# Patient Record
Sex: FEMALE | Race: WHITE | NOT HISPANIC OR LATINO | Employment: OTHER | URBAN - METROPOLITAN AREA
[De-identification: names, ages, dates, MRNs, and addresses within clinical notes are randomized per-mention and may not be internally consistent; named-entity substitution may affect disease eponyms.]

---

## 2021-01-25 ENCOUNTER — APPOINTMENT (INPATIENT)
Dept: RADIOLOGY | Facility: HOSPITAL | Age: 66
DRG: 603 | End: 2021-01-25
Payer: MEDICARE

## 2021-01-25 ENCOUNTER — HOSPITAL ENCOUNTER (INPATIENT)
Facility: HOSPITAL | Age: 66
LOS: 2 days | Discharge: HOME | DRG: 603 | End: 2021-01-27
Attending: EMERGENCY MEDICINE | Admitting: INTERNAL MEDICINE
Payer: MEDICARE

## 2021-01-25 DIAGNOSIS — N39.0 URINARY TRACT INFECTION: ICD-10-CM

## 2021-01-25 DIAGNOSIS — A41.9 SEPSIS (HCC): ICD-10-CM

## 2021-01-25 DIAGNOSIS — L03.116 CELLULITIS OF LEFT THIGH: Primary | ICD-10-CM

## 2021-01-25 PROBLEM — D64.9 ANEMIA: Status: ACTIVE | Noted: 2021-01-25

## 2021-01-25 PROBLEM — L03.114 CELLULITIS OF LEFT UPPER EXTREMITY: Status: ACTIVE | Noted: 2021-01-25

## 2021-01-25 PROBLEM — Z96.642 HISTORY OF REVISION OF TOTAL REPLACEMENT OF LEFT HIP JOINT: Status: ACTIVE | Noted: 2021-01-25

## 2021-01-25 PROBLEM — E87.1 HYPONATREMIA: Status: ACTIVE | Noted: 2021-01-25

## 2021-01-25 PROBLEM — F34.1 DYSTHYMIA: Status: ACTIVE | Noted: 2021-01-25

## 2021-01-25 PROBLEM — M05.79 RHEUMATOID ARTHRITIS INVOLVING MULTIPLE SITES WITH POSITIVE RHEUMATOID FACTOR (HCC): Status: ACTIVE | Noted: 2021-01-25

## 2021-01-25 LAB
ALBUMIN SERPL BCP-MCNC: 2.9 G/DL (ref 3.5–5)
ALP SERPL-CCNC: 98 U/L (ref 46–116)
ALT SERPL W P-5'-P-CCNC: 20 U/L (ref 12–78)
ANION GAP SERPL CALCULATED.3IONS-SCNC: 9 MMOL/L (ref 4–13)
APTT PPP: 36 SECONDS (ref 23–37)
AST SERPL W P-5'-P-CCNC: 17 U/L (ref 5–45)
BACTERIA UR QL AUTO: ABNORMAL /HPF
BASOPHILS # BLD AUTO: 0.02 THOUSANDS/ΜL (ref 0–0.1)
BASOPHILS NFR BLD AUTO: 0 % (ref 0–1)
BILIRUB SERPL-MCNC: 0.4 MG/DL (ref 0.2–1)
BILIRUB UR QL STRIP: NEGATIVE
BUN SERPL-MCNC: 22 MG/DL (ref 5–25)
CALCIUM ALBUM COR SERPL-MCNC: 10 MG/DL (ref 8.3–10.1)
CALCIUM SERPL-MCNC: 9.1 MG/DL (ref 8.3–10.1)
CHLORIDE SERPL-SCNC: 98 MMOL/L (ref 100–108)
CLARITY UR: ABNORMAL
CO2 SERPL-SCNC: 25 MMOL/L (ref 21–32)
COLOR UR: YELLOW
CREAT SERPL-MCNC: 0.81 MG/DL (ref 0.6–1.3)
CRP SERPL QL: 177.9 MG/L
EOSINOPHIL # BLD AUTO: 0.01 THOUSAND/ΜL (ref 0–0.61)
EOSINOPHIL NFR BLD AUTO: 0 % (ref 0–6)
ERYTHROCYTE [DISTWIDTH] IN BLOOD BY AUTOMATED COUNT: 15 % (ref 11.6–15.1)
GFR SERPL CREATININE-BSD FRML MDRD: 75 ML/MIN/1.73SQ M
GLUCOSE SERPL-MCNC: 95 MG/DL (ref 65–140)
GLUCOSE UR STRIP-MCNC: NEGATIVE MG/DL
HCT VFR BLD AUTO: 36.2 % (ref 34.8–46.1)
HGB BLD-MCNC: 11.4 G/DL (ref 11.5–15.4)
HGB UR QL STRIP.AUTO: ABNORMAL
IMM GRANULOCYTES # BLD AUTO: 0.04 THOUSAND/UL (ref 0–0.2)
IMM GRANULOCYTES NFR BLD AUTO: 1 % (ref 0–2)
INR PPP: 1.12 (ref 0.84–1.19)
KETONES UR STRIP-MCNC: NEGATIVE MG/DL
LACTATE SERPL-SCNC: 0.9 MMOL/L (ref 0.5–2)
LEUKOCYTE ESTERASE UR QL STRIP: ABNORMAL
LYMPHOCYTES # BLD AUTO: 0.52 THOUSANDS/ΜL (ref 0.6–4.47)
LYMPHOCYTES NFR BLD AUTO: 9 % (ref 14–44)
MCH RBC QN AUTO: 31.1 PG (ref 26.8–34.3)
MCHC RBC AUTO-ENTMCNC: 31.5 G/DL (ref 31.4–37.4)
MCV RBC AUTO: 99 FL (ref 82–98)
MONOCYTES # BLD AUTO: 0.77 THOUSAND/ΜL (ref 0.17–1.22)
MONOCYTES NFR BLD AUTO: 13 % (ref 4–12)
MUCOUS THREADS UR QL AUTO: ABNORMAL
NEUTROPHILS # BLD AUTO: 4.76 THOUSANDS/ΜL (ref 1.85–7.62)
NEUTS SEG NFR BLD AUTO: 77 % (ref 43–75)
NITRITE UR QL STRIP: NEGATIVE
NON-SQ EPI CELLS URNS QL MICRO: ABNORMAL /HPF
NRBC BLD AUTO-RTO: 0 /100 WBCS
PH UR STRIP.AUTO: >=9 [PH]
PLATELET # BLD AUTO: 290 THOUSANDS/UL (ref 149–390)
PMV BLD AUTO: 9.1 FL (ref 8.9–12.7)
POTASSIUM SERPL-SCNC: 3.7 MMOL/L (ref 3.5–5.3)
PROCALCITONIN SERPL-MCNC: 0.18 NG/ML
PROT SERPL-MCNC: 7.1 G/DL (ref 6.4–8.2)
PROT UR STRIP-MCNC: ABNORMAL MG/DL
PROTHROMBIN TIME: 14.4 SECONDS (ref 11.6–14.5)
RBC # BLD AUTO: 3.67 MILLION/UL (ref 3.81–5.12)
RBC #/AREA URNS AUTO: ABNORMAL /HPF
SODIUM SERPL-SCNC: 132 MMOL/L (ref 136–145)
SP GR UR STRIP.AUTO: 1.02 (ref 1–1.03)
UROBILINOGEN UR QL STRIP.AUTO: 0.2 E.U./DL
WBC # BLD AUTO: 6.12 THOUSAND/UL (ref 4.31–10.16)
WBC #/AREA URNS AUTO: ABNORMAL /HPF

## 2021-01-25 PROCEDURE — 36415 COLL VENOUS BLD VENIPUNCTURE: CPT | Performed by: PHYSICIAN ASSISTANT

## 2021-01-25 PROCEDURE — 85025 COMPLETE CBC W/AUTO DIFF WBC: CPT | Performed by: PHYSICIAN ASSISTANT

## 2021-01-25 PROCEDURE — 83605 ASSAY OF LACTIC ACID: CPT | Performed by: PHYSICIAN ASSISTANT

## 2021-01-25 PROCEDURE — 73502 X-RAY EXAM HIP UNI 2-3 VIEWS: CPT

## 2021-01-25 PROCEDURE — 93005 ELECTROCARDIOGRAM TRACING: CPT

## 2021-01-25 PROCEDURE — 81001 URINALYSIS AUTO W/SCOPE: CPT | Performed by: PHYSICIAN ASSISTANT

## 2021-01-25 PROCEDURE — 1123F ACP DISCUSS/DSCN MKR DOCD: CPT | Performed by: PHYSICIAN ASSISTANT

## 2021-01-25 PROCEDURE — 84145 PROCALCITONIN (PCT): CPT | Performed by: PHYSICIAN ASSISTANT

## 2021-01-25 PROCEDURE — 99223 1ST HOSP IP/OBS HIGH 75: CPT | Performed by: INTERNAL MEDICINE

## 2021-01-25 PROCEDURE — 80053 COMPREHEN METABOLIC PANEL: CPT | Performed by: PHYSICIAN ASSISTANT

## 2021-01-25 PROCEDURE — 99285 EMERGENCY DEPT VISIT HI MDM: CPT

## 2021-01-25 PROCEDURE — 73552 X-RAY EXAM OF FEMUR 2/>: CPT

## 2021-01-25 PROCEDURE — 85730 THROMBOPLASTIN TIME PARTIAL: CPT | Performed by: PHYSICIAN ASSISTANT

## 2021-01-25 PROCEDURE — 87040 BLOOD CULTURE FOR BACTERIA: CPT | Performed by: PHYSICIAN ASSISTANT

## 2021-01-25 PROCEDURE — 86140 C-REACTIVE PROTEIN: CPT | Performed by: PHYSICIAN ASSISTANT

## 2021-01-25 PROCEDURE — 96374 THER/PROPH/DIAG INJ IV PUSH: CPT

## 2021-01-25 PROCEDURE — 99284 EMERGENCY DEPT VISIT MOD MDM: CPT | Performed by: PHYSICIAN ASSISTANT

## 2021-01-25 PROCEDURE — 85610 PROTHROMBIN TIME: CPT | Performed by: PHYSICIAN ASSISTANT

## 2021-01-25 RX ORDER — LANOLIN ALCOHOL/MO/W.PET/CERES
800 CREAM (GRAM) TOPICAL 2 TIMES DAILY
Status: DISCONTINUED | OUTPATIENT
Start: 2021-01-25 | End: 2021-01-27 | Stop reason: HOSPADM

## 2021-01-25 RX ORDER — DOCUSATE SODIUM 100 MG/1
200 CAPSULE, LIQUID FILLED ORAL
Status: DISCONTINUED | OUTPATIENT
Start: 2021-01-25 | End: 2021-01-27 | Stop reason: HOSPADM

## 2021-01-25 RX ORDER — ASPIRIN 325 MG
325 TABLET ORAL DAILY
COMMUNITY

## 2021-01-25 RX ORDER — PRAMIPEXOLE DIHYDROCHLORIDE 0.5 MG/1
0.75 TABLET ORAL 4 TIMES DAILY
Status: DISCONTINUED | OUTPATIENT
Start: 2021-01-25 | End: 2021-01-27 | Stop reason: HOSPADM

## 2021-01-25 RX ORDER — GABAPENTIN 300 MG/1
300 CAPSULE ORAL 2 TIMES DAILY
Status: DISCONTINUED | OUTPATIENT
Start: 2021-01-25 | End: 2021-01-27 | Stop reason: HOSPADM

## 2021-01-25 RX ORDER — HYDROXYCHLOROQUINE SULFATE 200 MG/1
200 TABLET, FILM COATED ORAL 2 TIMES DAILY
Status: DISCONTINUED | OUTPATIENT
Start: 2021-01-25 | End: 2021-01-27 | Stop reason: HOSPADM

## 2021-01-25 RX ORDER — UREA 10 %
800 LOTION (ML) TOPICAL 2 TIMES DAILY
COMMUNITY

## 2021-01-25 RX ORDER — AMITRIPTYLINE HYDROCHLORIDE 50 MG/1
100 TABLET, FILM COATED ORAL
Status: DISCONTINUED | OUTPATIENT
Start: 2021-01-25 | End: 2021-01-27 | Stop reason: HOSPADM

## 2021-01-25 RX ORDER — ASPIRIN 325 MG
325 TABLET ORAL DAILY
Status: DISCONTINUED | OUTPATIENT
Start: 2021-01-26 | End: 2021-01-27 | Stop reason: HOSPADM

## 2021-01-25 RX ORDER — BACLOFEN 10 MG/1
10 TABLET ORAL
Status: DISCONTINUED | OUTPATIENT
Start: 2021-01-25 | End: 2021-01-26

## 2021-01-25 RX ORDER — SODIUM CHLORIDE 9 MG/ML
75 INJECTION, SOLUTION INTRAVENOUS CONTINUOUS
Status: DISCONTINUED | OUTPATIENT
Start: 2021-01-25 | End: 2021-01-26

## 2021-01-25 RX ORDER — AZITHROMYCIN 250 MG/1
250 TABLET, FILM COATED ORAL EVERY 24 HOURS
COMMUNITY

## 2021-01-25 RX ORDER — DOCUSATE SODIUM 100 MG/1
200 CAPSULE, LIQUID FILLED ORAL
COMMUNITY

## 2021-01-25 RX ORDER — ACETAMINOPHEN 325 MG/1
650 TABLET ORAL ONCE
Status: COMPLETED | OUTPATIENT
Start: 2021-01-25 | End: 2021-01-25

## 2021-01-25 RX ORDER — ONDANSETRON 2 MG/ML
4 INJECTION INTRAMUSCULAR; INTRAVENOUS EVERY 6 HOURS PRN
Status: DISCONTINUED | OUTPATIENT
Start: 2021-01-25 | End: 2021-01-27 | Stop reason: HOSPADM

## 2021-01-25 RX ORDER — ACETAMINOPHEN 325 MG/1
650 TABLET ORAL EVERY 6 HOURS PRN
Status: DISCONTINUED | OUTPATIENT
Start: 2021-01-25 | End: 2021-01-27 | Stop reason: HOSPADM

## 2021-01-25 RX ORDER — BACLOFEN 10 MG/1
10 TABLET ORAL
COMMUNITY

## 2021-01-25 RX ORDER — CEFTRIAXONE 1 G/50ML
1000 INJECTION, SOLUTION INTRAVENOUS ONCE
Status: COMPLETED | OUTPATIENT
Start: 2021-01-25 | End: 2021-01-25

## 2021-01-25 RX ORDER — CEFTRIAXONE 1 G/50ML
1000 INJECTION, SOLUTION INTRAVENOUS EVERY 24 HOURS
Status: DISCONTINUED | OUTPATIENT
Start: 2021-01-26 | End: 2021-01-27

## 2021-01-25 RX ADMIN — DOCUSATE SODIUM 200 MG: 100 CAPSULE, LIQUID FILLED ORAL at 21:42

## 2021-01-25 RX ADMIN — SODIUM CHLORIDE 75 ML/HR: 0.9 INJECTION, SOLUTION INTRAVENOUS at 21:32

## 2021-01-25 RX ADMIN — FOLIC ACID TAB 400 MCG 800 MCG: 400 TAB at 21:43

## 2021-01-25 RX ADMIN — ACETAMINOPHEN 650 MG: 325 TABLET, FILM COATED ORAL at 18:21

## 2021-01-25 RX ADMIN — GABAPENTIN 300 MG: 300 CAPSULE ORAL at 21:42

## 2021-01-25 RX ADMIN — VANCOMYCIN HYDROCHLORIDE 750 MG: 750 INJECTION, SOLUTION INTRAVENOUS at 21:43

## 2021-01-25 RX ADMIN — PRAMIPEXOLE DIHYDROCHLORIDE 0.75 MG: 0.5 TABLET ORAL at 21:42

## 2021-01-25 RX ADMIN — AMITRIPTYLINE HYDROCHLORIDE 100 MG: 50 TABLET, FILM COATED ORAL at 21:42

## 2021-01-25 RX ADMIN — CEFTRIAXONE 1000 MG: 1 INJECTION, SOLUTION INTRAVENOUS at 18:21

## 2021-01-25 RX ADMIN — HYDROXYCHLOROQUINE SULFATE 200 MG: 200 TABLET, FILM COATED ORAL at 21:48

## 2021-01-26 PROBLEM — L03.116 CELLULITIS OF LEFT LOWER EXTREMITY: Status: ACTIVE | Noted: 2021-01-25

## 2021-01-26 LAB
ANION GAP SERPL CALCULATED.3IONS-SCNC: 7 MMOL/L (ref 4–13)
BASOPHILS # BLD AUTO: 0.03 THOUSANDS/ΜL (ref 0–0.1)
BASOPHILS NFR BLD AUTO: 1 % (ref 0–1)
BUN SERPL-MCNC: 15 MG/DL (ref 5–25)
CALCIUM SERPL-MCNC: 8.9 MG/DL (ref 8.3–10.1)
CHLORIDE SERPL-SCNC: 100 MMOL/L (ref 100–108)
CO2 SERPL-SCNC: 26 MMOL/L (ref 21–32)
CREAT SERPL-MCNC: 0.81 MG/DL (ref 0.6–1.3)
EOSINOPHIL # BLD AUTO: 0.09 THOUSAND/ΜL (ref 0–0.61)
EOSINOPHIL NFR BLD AUTO: 2 % (ref 0–6)
ERYTHROCYTE [DISTWIDTH] IN BLOOD BY AUTOMATED COUNT: 15.1 % (ref 11.6–15.1)
GFR SERPL CREATININE-BSD FRML MDRD: 75 ML/MIN/1.73SQ M
GLUCOSE SERPL-MCNC: 108 MG/DL (ref 65–140)
HCT VFR BLD AUTO: 31.3 % (ref 34.8–46.1)
HGB BLD-MCNC: 9.8 G/DL (ref 11.5–15.4)
IMM GRANULOCYTES # BLD AUTO: 0.11 THOUSAND/UL (ref 0–0.2)
IMM GRANULOCYTES NFR BLD AUTO: 2 % (ref 0–2)
LYMPHOCYTES # BLD AUTO: 0.87 THOUSANDS/ΜL (ref 0.6–4.47)
LYMPHOCYTES NFR BLD AUTO: 15 % (ref 14–44)
MCH RBC QN AUTO: 31.2 PG (ref 26.8–34.3)
MCHC RBC AUTO-ENTMCNC: 31.3 G/DL (ref 31.4–37.4)
MCV RBC AUTO: 100 FL (ref 82–98)
MONOCYTES # BLD AUTO: 1.01 THOUSAND/ΜL (ref 0.17–1.22)
MONOCYTES NFR BLD AUTO: 17 % (ref 4–12)
NEUTROPHILS # BLD AUTO: 3.81 THOUSANDS/ΜL (ref 1.85–7.62)
NEUTS SEG NFR BLD AUTO: 63 % (ref 43–75)
NRBC BLD AUTO-RTO: 0 /100 WBCS
PLATELET # BLD AUTO: 242 THOUSANDS/UL (ref 149–390)
PMV BLD AUTO: 9.2 FL (ref 8.9–12.7)
POTASSIUM SERPL-SCNC: 3.8 MMOL/L (ref 3.5–5.3)
PROCALCITONIN SERPL-MCNC: 0.15 NG/ML
RBC # BLD AUTO: 3.14 MILLION/UL (ref 3.81–5.12)
SODIUM SERPL-SCNC: 133 MMOL/L (ref 136–145)
WBC # BLD AUTO: 5.92 THOUSAND/UL (ref 4.31–10.16)

## 2021-01-26 PROCEDURE — 84145 PROCALCITONIN (PCT): CPT | Performed by: PHYSICIAN ASSISTANT

## 2021-01-26 PROCEDURE — 80048 BASIC METABOLIC PNL TOTAL CA: CPT | Performed by: INTERNAL MEDICINE

## 2021-01-26 PROCEDURE — 99232 SBSQ HOSP IP/OBS MODERATE 35: CPT | Performed by: INTERNAL MEDICINE

## 2021-01-26 PROCEDURE — 97163 PT EVAL HIGH COMPLEX 45 MIN: CPT

## 2021-01-26 PROCEDURE — 85025 COMPLETE CBC W/AUTO DIFF WBC: CPT | Performed by: INTERNAL MEDICINE

## 2021-01-26 RX ORDER — BACLOFEN 10 MG/1
10 TABLET ORAL 3 TIMES DAILY PRN
Status: DISCONTINUED | OUTPATIENT
Start: 2021-01-26 | End: 2021-01-27 | Stop reason: HOSPADM

## 2021-01-26 RX ADMIN — HYDROXYCHLOROQUINE SULFATE 200 MG: 200 TABLET, FILM COATED ORAL at 18:00

## 2021-01-26 RX ADMIN — CEFTRIAXONE 1000 MG: 1 INJECTION, SOLUTION INTRAVENOUS at 17:58

## 2021-01-26 RX ADMIN — VANCOMYCIN HYDROCHLORIDE 750 MG: 750 INJECTION, SOLUTION INTRAVENOUS at 21:17

## 2021-01-26 RX ADMIN — ENOXAPARIN SODIUM 40 MG: 40 INJECTION SUBCUTANEOUS at 09:02

## 2021-01-26 RX ADMIN — PRAMIPEXOLE DIHYDROCHLORIDE 0.75 MG: 0.5 TABLET ORAL at 17:58

## 2021-01-26 RX ADMIN — PRAMIPEXOLE DIHYDROCHLORIDE 0.75 MG: 0.5 TABLET ORAL at 12:59

## 2021-01-26 RX ADMIN — BACLOFEN 10 MG: 10 TABLET ORAL at 21:24

## 2021-01-26 RX ADMIN — ACETAMINOPHEN 650 MG: 325 TABLET, FILM COATED ORAL at 22:44

## 2021-01-26 RX ADMIN — ASPIRIN 325 MG ORAL TABLET 325 MG: 325 PILL ORAL at 09:01

## 2021-01-26 RX ADMIN — PRAMIPEXOLE DIHYDROCHLORIDE 0.75 MG: 0.5 TABLET ORAL at 21:16

## 2021-01-26 RX ADMIN — PRAMIPEXOLE DIHYDROCHLORIDE 0.75 MG: 0.5 TABLET ORAL at 09:02

## 2021-01-26 RX ADMIN — AMITRIPTYLINE HYDROCHLORIDE 100 MG: 50 TABLET, FILM COATED ORAL at 21:17

## 2021-01-26 RX ADMIN — FOLIC ACID TAB 400 MCG 800 MCG: 400 TAB at 21:16

## 2021-01-26 RX ADMIN — GABAPENTIN 300 MG: 300 CAPSULE ORAL at 09:02

## 2021-01-26 RX ADMIN — FOLIC ACID TAB 400 MCG 800 MCG: 400 TAB at 09:02

## 2021-01-26 RX ADMIN — GABAPENTIN 300 MG: 300 CAPSULE ORAL at 17:58

## 2021-01-26 RX ADMIN — HYDROXYCHLOROQUINE SULFATE 200 MG: 200 TABLET, FILM COATED ORAL at 09:05

## 2021-01-26 RX ADMIN — BACLOFEN 10 MG: 10 TABLET ORAL at 09:25

## 2021-01-26 RX ADMIN — VANCOMYCIN HYDROCHLORIDE 750 MG: 750 INJECTION, SOLUTION INTRAVENOUS at 09:03

## 2021-01-26 NOTE — ED NOTES
Pt is resting quietly awaiting admission  No complaints at present time  Pt is watching tv  Blankets removed from pt  D/t temp  VSS  SR on  Monitor  Will continue to monitor       Norma Abarca RN  01/25/21 1928

## 2021-01-26 NOTE — PROGRESS NOTES
Vancomycin IV Pharmacy-to-Dose Consultation    Quincy Verduzco is a 77 y o  female who is currently receiving Vancomycin IV with management by the Pharmacy Consult service  Assessment/Plan:  The patient was reviewed  Renal function is stable and no signs or symptoms of nephrotoxicity and/or infusion reactions were documented in the chart  Based on todays assessment, continue current vancomycin (day # 2) dosing of 750 mg Q12H, with a plan for trough to be drawn at 0830 on 01/27/2021  We will continue to follow the patients culture results and clinical progress daily      Jamee Rodriguez, Pharmacist

## 2021-01-26 NOTE — PROGRESS NOTES
Progress Note - Jocelynn Mendoza 1955, 77 y o  female MRN: [id number]    Unit/Bed#: -01 Encounter: [id number]    Primary Care Provider: Rah Hunter MD   Date and time admitted to hospital: 1/25/2021  3:59 PM        Urinary tract infection  Assessment & Plan  · Urine:  · UA: small amount of leukocytes, nitrite negative   · Micro: WBC 10-20, Moderate amounts of bacteria   · Febrile at 103 1 in ED, Normal WBC, lactic negative   · Temperature now resolved after tylenol   · Continue with Ceftriaxone   · Follow Urine cultures      * Cellulitis of left lower extremity  Assessment & Plan  Left thigh cellulitis, poa, noted to have 2 circular erythematous patches  · 3-4 cm circular erythema patch with warmth  Tender to palpation  Patient able to ambulate and has full ROM of left leg without pain  No drainage or fluctuance  · Currently on IV vancomycin for cellulitis  · Pharmacy managing  · Erythema improving  · Trend CBC and temp curve  · Tylenol prn for fevers    Rheumatoid arthritis involving multiple sites with positive rheumatoid factor (HCC)  Assessment & Plan  Continue methotrexate, Plaquenil, and rituximab    Anemia  Assessment & Plan  · Hemoglobin 11 4 on admission  · Baseline Hb 7 9  · No signs of active bleeding   · Keep Hb > 7  · Trend CBC    Hyponatremia  Assessment & Plan  · Na 132 on admission  · S/p IVFs  ·  this am  · Trend BMP        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  Assessment & Plan  · Surgery performed last month at [facility]   · Reports great recovery over the last month  Has had no pain, redness, drainage or problems from surgical site  · Reports she was placed on azithromcyin 250 mg daily as prophylaxis after the surgery that she is to continue for the next two months   · Hold while in the hospital while on ceftriaxone and vancomycin   · Has full ROM with left leg   · Follow results of left pelvic, hip and femur XRAY         Dysthymia  Assessment & Plan  · Continue elavil 100 mg daily       VTE Pharmacologic Prophylaxis:   Pharmacologic: Enoxaparin (Lovenox)  Mechanical VTE Prophylaxis in Place: Yes    Patient Centered Rounds: I have performed bedside rounds with nursing staff today  Discussions with Specialists or Other Care Team Provider: ANJANA    Education and Discussions with Family / Patient: patient declined family update    Time Spent for Care: 30 minutes  More than 50% of total time spent on counseling and coordination of care as described above  Current Length of Stay: 1 day(s)    Current Patient Status: Inpatient   Certification Statement: The patient will continue to require additional inpatient hospital stay due to as above    Discharge Plan: pending urine culture results    Code Status: Level 1 - Full Code      Subjective:   No overnight events, no new complaints this am, feeling better    Objective:     Vitals:   Temp (24hrs), Av 3 °F (38 5 °C), Min:98 7 °F (37 1 °C), Max:103 1 °F (39 5 °C)    Temp:  [98 7 °F (37 1 °C)-103 1 °F (39 5 °C)] 98 7 °F (37 1 °C)  HR:  [83-99] 83  Resp:  [13-20] 14  BP: (103-145)/(56-64) 103/56  SpO2:  [95 %-100 %] 96 %  Body mass index is 25 23 kg/m²  Input and Output Summary (last 24 hours):     No intake or output data in the 24 hours ending 21 0929    Physical Exam:     Physical Exam  Vitals signs and nursing note reviewed  Constitutional:       General: She is not in acute distress  Appearance: Normal appearance  She is not ill-appearing, toxic-appearing or diaphoretic  Cardiovascular:      Rate and Rhythm: Normal rate and regular rhythm  Pulses: Normal pulses  Heart sounds: No murmur  Pulmonary:      Effort: Pulmonary effort is normal  No respiratory distress  Breath sounds: No stridor  No wheezing, rhonchi or rales  Chest:      Chest wall: No tenderness  Abdominal:      General: Bowel sounds are normal  There is no distension  Palpations: Abdomen is soft  Tenderness: There is no abdominal tenderness  There is no right CVA tenderness, left CVA tenderness or guarding  Hernia: No hernia is present  Musculoskeletal: Normal range of motion  General: No swelling or deformity  Right lower leg: No edema  Left lower leg: No edema  Skin:     General: Skin is warm  Capillary Refill: Capillary refill takes less than 2 seconds  Coloration: Skin is not jaundiced  Findings: Erythema (left thigh erythema) present  No bruising or lesion  Neurological:      General: No focal deficit present  Mental Status: She is alert  Mental status is at baseline  Cranial Nerves: No cranial nerve deficit  Psychiatric:         Mood and Affect: Mood normal          Thought Content: Thought content normal          Judgment: Judgment normal        Additional Data:     Labs:    Results from last 7 days   Lab Units 01/26/21  0518   WBC Thousand/uL 5 92   HEMOGLOBIN g/dL 9 8*   HEMATOCRIT % 31 3*   PLATELETS Thousands/uL 242   NEUTROS PCT % 63   LYMPHS PCT % 15   MONOS PCT % 17*   EOS PCT % 2     Results from last 7 days   Lab Units 01/26/21  0518 01/25/21  1621   SODIUM mmol/L 133* 132*   POTASSIUM mmol/L 3 8 3 7   CHLORIDE mmol/L 100 98*   CO2 mmol/L 26 25   BUN mg/dL 15 22   CREATININE mg/dL 0 81 0 81   ANION GAP mmol/L 7 9   CALCIUM mg/dL 8 9 9 1   ALBUMIN g/dL  --  2 9*   TOTAL BILIRUBIN mg/dL  --  0 40   ALK PHOS U/L  --  98   ALT U/L  --  20   AST U/L  --  17   GLUCOSE RANDOM mg/dL 108 95     Results from last 7 days   Lab Units 01/25/21  1621   INR  1 12             Results from last 7 days   Lab Units 01/25/21  1621   LACTIC ACID mmol/L 0 9   PROCALCITONIN ng/ml 0 18           * I Have Reviewed All Lab Data Listed Above  * Additional Pertinent Lab Tests Reviewed:  All Labs Within Last 24 Hours Reviewed    Imaging:    Imaging Reports Reviewed Today Include:   Imaging Personally Reviewed by Myself Includes:      Recent Cultures (last 7 days): Results from last 7 days   Lab Units 01/25/21  1622 01/25/21  1621   BLOOD CULTURE  Received in Microbiology Lab  Culture in Progress  Received in Microbiology Lab  Culture in Progress  Last 24 Hours Medication List:   Current Facility-Administered Medications   Medication Dose Route Frequency Provider Last Rate    acetaminophen  650 mg Oral Q6H PRN Deborrah Belinda, PA-C      amitriptyline  100 mg Oral HS Deborrah Belinda, PA-C      aspirin  325 mg Oral Daily Deborrah Belinda, PA-C      baclofen  10 mg Oral TID PRN Adam Yusuf MD      cefTRIAXone  1,000 mg Intravenous Q24H Deborrah Belinda, PA-C      docusate sodium  200 mg Oral HS Deborrah Belinda, PA-C      enoxaparin  40 mg Subcutaneous Daily Deborrah Belinda, PA-C      folic acid  786 mcg Oral BID Deborrah Belinda, PA-C      gabapentin  300 mg Oral BID Deborrah Belinda, PA-C      hydroxychloroquine  200 mg Oral BID Deborrah Belinda, PA-C      ondansetron  4 mg Intravenous Q6H PRN Deborrah Belinda, PA-C      pramipexole  0 75 mg Oral 4x Daily Deborrah Belinda, PA-C      vancomycin  12 5 mg/kg Intravenous Q12H Deborrah Belinda, PA-C 750 mg (01/26/21 6181)        Today, Patient Was Seen By: Adam Yusuf MD    ** Please Note: Dictation voice to text software may have been used in the creation of this document   **

## 2021-01-26 NOTE — ASSESSMENT & PLAN NOTE
· Surgery performed last month at [facility]   · Reports great recovery over the last month  Has had no pain, redness, drainage or problems from surgical site  · Reports she was placed on azithromcyin 250 mg daily as prophylaxis after the surgery that she is to continue for the next two months   · Hold while in the hospital while on ceftriaxone and vancomycin   · Has full ROM with left leg   · X-Ray of left pelvis, hip and femur ordered

## 2021-01-26 NOTE — PROGRESS NOTES
Vancomycin Assessment    Tala Sebastian is a 77 y o  female who is currently receiving vancomycin 750mg IV Q12H for skin-soft tissue infection     Relevant clinical data and objective history reviewed:  Creatinine   Date Value Ref Range Status   01/25/2021 0 81 0 60 - 1 30 mg/dL Final     Comment:     Standardized to IDMS reference method   12/01/2016 0 91 0 60 - 1 30 mg/dL Final     Comment:     Standardized to IDMS reference method     /56 (BP Location: Left arm)   Pulse 83   Temp 98 7 °F (37 1 °C) (Oral)   Resp 14   Ht 5' 4" (1 626 m)   Wt 66 7 kg (147 lb)   SpO2 96%   BMI 25 23 kg/m²   No intake/output data recorded  Lab Results   Component Value Date/Time    BUN 22 01/25/2021 04:21 PM    WBC 6 12 01/25/2021 04:21 PM    HGB 11 4 (L) 01/25/2021 04:21 PM    HCT 36 2 01/25/2021 04:21 PM    MCV 99 (H) 01/25/2021 04:21 PM     01/25/2021 04:21 PM     Temp Readings from Last 3 Encounters:   01/25/21 98 7 °F (37 1 °C) (Oral)   12/01/16 (!) 97 2 °F (36 2 °C) (Tympanic)     Vancomycin Days of Therapy: 1    Assessment/Plan  The patient is currently on vancomycin utilizing scheduled dosing based on actual body weight  The patient will receive vancomycin 750mg IV Q12H  Pharmacy will also follow closely for s/sx of nephrotoxicity, infusion reactions, and appropriateness of therapy  BMP and CBC will be ordered per protocol  Plan for trough as patient approaches steady state, prior to the 4th  dose at approximately 0830 on 1/27/21  Due to infection severity, will target a trough of 15-20 (appropriate for most indications)   Pharmacy will continue to follow the patients culture results and clinical progress daily      Juan M Centeno, Pharmacist

## 2021-01-26 NOTE — H&P
H&P- Alex Villalta 1955, 77 y o  female MRN: [id number]    Unit/Bed#: -01 Encounter: [id number]  Primary Care Provider: Sulaiman Frey MD   Date and time admitted to hospital: 1/25/2021  3:59 PM    * Cellulitis of left upper extremity  Assessment & Plan  · Came into ED due to circular patch of erythema that appeared two days on 1/23 on her left upper thigh  Febrile 103 in ED  Reports fevers over the past two days  · 3-4 cm circular erythema patch with warmth  Tender to palpation  Patient able to ambulate and has full rom of left leg without pain  No drainage or fluctuance  · Normal WBC, Normal lactic  · In Ed started on ceftriaxone  Continue with ceftriaxone and add IV vancomycin      Urinary tract infection  Assessment & Plan  · Urine:  · UA: small amount of leukocytes, nitrite negative   · Micro: WBC 10-20, Moderate amounts of bacteria   · Febrile at 103 1 in ED, Normal WBC, lactic negative   · Temperature now resolved after tylenol   · Blood cultures and procal pending   · Started on Ceftriaxone in ED for infection and tylenol for fever  · Continue with Ceftriaxone     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  Assessment & Plan  · Surgery performed last month at [place]   · Reports great recovery over the last month  Has had no pain, redness, drainage or problems from surgical site  · Reports she was placed on azithromcyin 250 mg daily as prophylaxis after the surgery that she is to continue for the next two months   · Hold while in the hospital while on ceftriaxone and vancomycin   · Has full ROM with left leg   · X-Ray of left pelvis, hip and femur ordered  Hyponatremia  Assessment & Plan  · Na 132 on admission     · Start gentle fluids   · Recheck in the am       Rheumatoid arthritis involving multiple sites with positive rheumatoid factor (HCC)  Assessment & Plan  Continue methotrexate, Plaquenil, and rituximab    Anemia  Assessment & Plan  · Hemoglobin of 11 4   · No signs of active bleeding   · Continue to monitor     Dysthymia  Assessment & Plan  · Continue elavil 100 mg daily       VTE Prophylaxis: Enoxaparin (Lovenox)  / sequential compression device   Code Status: Level 1 Full Code   POLST: There is no POLST form on file for this patient (pre-hospital)  Discussion with family: No     Anticipated Length of Stay:  Patient will be admitted on an Inpatient basis with an anticipated length of stay of  > 2 midnights  Justification for Hospital Stay: Cellulitis and UTI    Total Time for Visit, including Counseling / Coordination of Care: 1 hour  Greater than 50% of this total time spent on direct patient counseling and coordination of care  Chief Complaint:   [place] on left thigh and fever     History of Present Illness:    Hero Au is a 77 y o  female with PMH of Rheumatoid arthritis, hip replacement,and dysthmia who presents to the ED due to a 3-4 cm circular erythema area on her left thigh that appeared two days ago  Patient also reports fevers of around 101 since 1/23  In the ED patient recorded with fever of 103  Patient reports that she had a left hip replacement/repair one month ago at [facility]  Recovery from the surgery has been going well  They prescribed her azithromycin 250 mg daily as prophylaxis after the surgery to be continued for 2 more months for a total of three months  Patient denies pain, redness or drainage from surgical site  Has full ROM without pain and able to bear weight  Denies sob, cough, congestion, N/V/D  Patient also reports over the past week she has had some burning with urination  Denies increased or decreased urination frequency  Denies hematuria  Review of Systems:    Review of Systems   Constitutional: Positive for chills and fever  Negative for fatigue  HENT: Negative for sore throat  Respiratory: Negative for cough, chest tightness and shortness of breath      Cardiovascular: Negative for chest pain    Gastrointestinal: Negative for abdominal distention, abdominal pain, diarrhea, nausea and vomiting  Genitourinary: Negative for difficulty urinating  Musculoskeletal: Negative for arthralgias  Skin: Positive for color change and rash  Neurological: Negative for weakness and headaches  Psychiatric/Behavioral: Negative for agitation and behavioral problems  All other systems reviewed and are negative  Past Medical and Surgical History:     Past Medical History:   Diagnosis Date    Chronic pain     Rheumatoid arthritis ([facility] [address] )        Past Surgical History:   Procedure Laterality Date    CARPAL TUNNEL RELEASE Bilateral     HIP OPEN REDUCTION Right     HIP SURGERY Left     HYSTERECTOMY      TONSILLECTOMY         Meds/Allergies:    Prior to Admission medications    Medication Sig Start Date End Date Taking?  Authorizing Provider   amitriptyline (ELAVIL) 75 mg tablet Take 100 mg by mouth daily at bedtime    Yes Historical Provider, MD   aspirin 325 mg tablet Take 325 mg by mouth daily   Yes Historical Provider, MD   baclofen 10 mg tablet Take 10 mg by mouth daily at bedtime   Yes Historical Provider, MD   Cholecalciferol (VITAMIN D3) 1000 UNITS CAPS Take 2 capsules by mouth 2 (two) times a day    Yes Historical Provider, MD   CRANBERRY PO Take 4,200 mg by mouth 4 (four) times a day    Yes Historical Provider, MD   docusate sodium (COLACE) 100 mg capsule Take 200 mg by mouth daily at bedtime   Yes Historical Provider, MD   folic acid (FOLVITE) 971 MCG tablet Take 800 mcg by mouth 2 (two) times a day   Yes Historical Provider, MD   gabapentin (NEURONTIN) 300 mg capsule Take 300 mg by mouth 2 (two) times a day   Yes Historical Provider, MD   hydroxychloroquine (PLAQUENIL) 200 mg tablet Take 200 mg by mouth daily 3 Tablets at HS   Yes Historical Provider, MD   methotrexate 2 5 mg tablet Take 2 5 mg by mouth once a week 8 Tablets weekly   Yes Historical Provider, MD   pramipexole (MIRAPEX) 0 25 mg tablet Take 0 75 mg by mouth 4 (four) times a day    Yes Historical Provider, MD   carisoprodol (SOMA) 350 mg tablet Take 700 mg by mouth daily at bedtime    Historical Provider, MD   cycloSPORINE non-modified (SandIMMUNE) 100 mg capsule Take 100 mg by mouth daily    Historical Provider, MD   famotidine (PEPCID) 20 mg tablet Take 20 mg by mouth 2 (two) times a day    Historical Provider, MD   Magnesium 500 MG CAPS Take 1 capsule by mouth daily    Historical Provider, MD     I have reviewed home medications with patient personally  Allergies: Allergies   Allergen Reactions    Remicade [Infliximab]     Tetanus Toxoids        Social History:     Marital Status:    Occupation: Na  Patient Pre-hospital Living Situation: Home   Patient Pre-hospital Level of Mobility: Full  Patient Pre-hospital Diet Restrictions: Regular  Substance Use History:   Social History     Substance and Sexual Activity   Alcohol Use No     Social History     Tobacco Use   Smoking Status Former Smoker     Social History     Substance and Sexual Activity   Drug Use No       Family History:    History reviewed  No pertinent family history  Physical Exam:     Vitals:   Blood Pressure: 103/56 (01/25/21 2040)  Pulse: 83 (01/25/21 2040)  Temperature: 98 7 °F (37 1 °C) (01/25/21 2040)  Temp Source: Oral (01/25/21 2040)  Respirations: 14 (01/25/21 2040)  Height: 5' 4" (162 6 cm) (01/25/21 1607)  Weight - Scale: 66 7 kg (147 lb) (01/25/21 1607)  SpO2: 96 % (01/25/21 1930)    Physical Exam  Vitals signs and nursing note reviewed  Constitutional:       Appearance: Normal appearance  HENT:      Head: Normocephalic  Eyes:      Extraocular Movements: Extraocular movements intact  Pupils: Pupils are equal, round, and reactive to light  Neck:      Musculoskeletal: Normal range of motion  Cardiovascular:      Rate and Rhythm: Normal rate and regular rhythm  Heart sounds: No murmur     Pulmonary:      Effort: Pulmonary effort is normal  No respiratory distress  Breath sounds: Normal breath sounds  No wheezing  Abdominal:      General: Bowel sounds are normal  There is no distension  Tenderness: There is no abdominal tenderness  There is no guarding  Musculoskeletal: Normal range of motion  Right lower leg: No edema  Left lower leg: No edema  Skin:     General: Skin is warm  Findings: Erythema and rash present  Comments: 3-4 cm erythema circular area on left thigh   Neurological:      General: No focal deficit present  Mental Status: She is alert and oriented to person, place, and time  Psychiatric:         Mood and Affect: Mood normal          Behavior: Behavior normal          Thought Content: Thought content normal          Additional Data:     Lab Results: I have personally reviewed pertinent reports  Results from last 7 days   Lab Units 01/25/21  1621   WBC Thousand/uL 6 12   HEMOGLOBIN g/dL 11 4*   HEMATOCRIT % 36 2   PLATELETS Thousands/uL 290   NEUTROS PCT % 77*   LYMPHS PCT % 9*   MONOS PCT % 13*   EOS PCT % 0     Results from last 7 days   Lab Units 01/25/21  1621   SODIUM mmol/L 132*   POTASSIUM mmol/L 3 7   CHLORIDE mmol/L 98*   CO2 mmol/L 25   BUN mg/dL 22   CREATININE mg/dL 0 81   ANION GAP mmol/L 9   CALCIUM mg/dL 9 1   ALBUMIN g/dL 2 9*   TOTAL BILIRUBIN mg/dL 0 40   ALK PHOS U/L 98   ALT U/L 20   AST U/L 17   GLUCOSE RANDOM mg/dL 95     Results from last 7 days   Lab Units 01/25/21  1621   INR  1 12             Results from last 7 days   Lab Units 01/25/21  1621   LACTIC ACID mmol/L 0 9   PROCALCITONIN ng/ml 0 18       Imaging: I have personally reviewed pertinent reports  XR hip/pelv 2-3 vws left if performed    (Results Pending)   XR femur 2 vw left    (Results Pending)     Allscripts / Epic Records Reviewed: Yes     ** Please Note: This note has been constructed using a voice recognition system   **

## 2021-01-26 NOTE — ASSESSMENT & PLAN NOTE
Left thigh cellulitis, poa, noted to have 2 circular erythematous patches  · 3-4 cm circular erythema patch with warmth  Tender to palpation  Patient able to ambulate and has full ROM of left leg without pain  No drainage or fluctuance     · Currently on IV vancomycin for cellulitis  · Pharmacy managing  · Erythema improving  · Trend CBC and temp curve  · Tylenol prn for fevers Patient Information     Patient Name MRN Fatou Villafana [id number] Female 1939      Telephone Encounter by Domingo Greene MD at 2017  5:41 PM     Author:  Domingo Greene MD Service:  (none) Author Type:  Physician     Filed:  2017  5:42 PM Encounter Date:  2017 Status:  Signed     :  Domingo Greene MD (Physician)            Nursing able to reach patient.    Patient not able to start medications today.      Initiate potassium replacement 40 mEq daily ×5 days, then 20 mEq daily.    Start magnesium replacement as well to help improve potassium blood levels.    Prescription sent to pharmacy.    Domingo Greene MD

## 2021-01-26 NOTE — ASSESSMENT & PLAN NOTE
· Urine:  · UA: small amount of leukocytes, nitrite negative   · Micro: WBC 10-20, Moderate amounts of bacteria   · Febrile at 103 1 in ED, Normal WBC, lactic negative   · Temperature now resolved after tylenol   · Continue with Ceftriaxone   · Follow Urine cultures

## 2021-01-26 NOTE — PHYSICAL THERAPY NOTE
PT eval   01/26/21 0904   PT Last Visit   PT Visit Date 01/26/21   Note Type   Note type Evaluation   Pain Assessment   Pain Assessment Tool 0-10   Pain Score No Pain   Home Living   Type of Home House   Home Layout Two level   Home Equipment Cane   Prior Function   Level of [place] Independent with ADLs and functional mobility   Lives With Spouse   Receives Help From Family   ADL Assistance Independent   IADLs Needs assistance   Falls in the last 6 months 0   Vocational Retired   Restrictions/Precautions   [place] Precautions Per Order No   Other Precautions THR   General   Additional Pertinent History adm with sepsis, cellulitis r/o uti   recent L THR revision after infected prior prosthesis with antibiotic spacer, still attentds out-pt PT and uses cane, RA,   Family/Caregiver Present No   Cognition   Overall Cognitive Status WFL   Arousal/Participation Alert   Orientation Level Oriented X4   Memory Within functional limits   Following Commands Follows all commands and directions without difficulty   RUE Assessment   RUE Assessment WFL   LUE Assessment   LUE Assessment WFL   RLE Assessment   RLE Assessment WFL   LLE Assessment   LLE Assessment WFL  (strength hip 3+/5)   Coordination   Movements are Fluid and Coordinated 1   Proprioception   RLE Proprioception Grossly intact   LLE Proprioception Grossly Intact   Bed Mobility   Supine to Sit 7  Independent   Transfers   Sit to Stand 5  Supervision   Stand to Sit 5  Supervision   Stand pivot 5  Supervision   Additional items   (cane)   Ambulation/Elevation   Gait pattern WNL   Gait Assistance 6  Modified independent   Assistive Device Straight cane   Distance 50'x2   Balance   Static Sitting Normal   Dynamic Sitting Normal   Static Standing Good   Dynamic Standing Fair +   Ambulatory Fair +   Endurance Deficit   Endurance Deficit No   Activity Tolerance   Activity Tolerance Patient tolerated treatment well   Nurse Made Aware Christopher Daliey Prognosis Good   Assessment Pt presents as a functional ambulator with sp cane after admssion for uti/sepsis but reveision L THR 12/2020  No skilled PT needs at this time  appropriate to return home with continued out-pt PT as prior   d/c PT   Barriers to Discharge   (medical clearance)   Goals   Patient Goals get better go home   PT Treatment Day   (d/c PT)   Plan   PT Frequency   (d/c PT)   Recommendation   PT Discharge Recommendation   (home with out-pt PT)   Equipment Recommended Cane  (has own)   PT - OK to Discharge Yes   AM-PAC Basic Mobility Inpatient   Turning in Bed Without Bedrails 4   Lying on Back to Sitting on Edge of Flat Bed 4   Moving Bed to Chair 4   Standing Up From Chair 4   Walk in Room 4   Climb 3-5 Stairs 4   Basic Mobility Inpatient Raw Score 24   Basic Mobility Standardized Score 57 68   Modified [place] Scale   Modified [place] Scale 1   Neyda Redman, PT

## 2021-01-26 NOTE — UTILIZATION REVIEW
Notification of Inpatient Admission/Inpatient Authorization Request   This is a Notification of Inpatient Admission for [place]  Be advised that this patient was admitted to our facility under Inpatient Status  Contact Anu Wheeler at [phone] for additional admission information  [facility] DEPT  DEDICATED [phone]  Patient Name:   Anita Burkett   YOB: 1955       [address]   [address]:   [address]  Tax ID: [id number]  NPI: [id number] Attending Provider/NPI:  Phone:  Address: Franc Rascon [[id number]]  [phone]  SAME AS FACILITY   Place of Service Code: 24 [facility]   Start Date: 1/25/21 1834 Discharge Date & Time: No discharge date for patient encounter  Type of Admission: Inpatient Status Discharge Disposition (if discharged): Home/Self Care   Patient Diagnoses: Hip pain [M25 559]  Urinary tract infection [N39 0]  Cellulitis of left thigh [T04 737]  Sepsis ([address] ) [A41 9]     Orders: Admission Orders (From admission, onward)     Ordered        01/25/21 1834  Inpatient Admission  Once                    Assigned Utilization Review Contact: Jodee Wheeler  Utilization   Network Utilization Review Department  Phone: [phone]; Fax [phone]  Email: [email or web address]   ATTENTION PAYERS: Please call the assigned Utilization  directly with any questions or concerns ALL voicemails in the department are confidential  Send all requests for admission clinical reviews, approved or denied determinations and any other requests to dedicated fax number belonging to the campus where the patient is receiving treatment

## 2021-01-26 NOTE — UTILIZATION REVIEW
Initial Clinical Review    Admission: Date/Time/Statement:   Admission Orders (From admission, onward)     Ordered        01/25/21 1834  Inpatient Admission  Once                   Orders Placed This Encounter   Procedures    Inpatient Admission     Standing Status:   Standing     Number of Occurrences:   1     Order Specific Question:   Level of Care     Answer:   Med Surg [16]     Order Specific Question:   Estimated length of stay     Answer:   More than 2 Midnights     Order Specific Question:   Certification     Answer:   I certify that inpatient services are medically necessary for this patient for a duration of greater than two midnights  See H&P and MD Progress Notes for additional information about the patient's course of treatment  ED Arrival Information     Expected Arrival Acuity Means of Arrival Escorted By Service Admission Type    1/25/2021 15:46 1/25/2021 15:58 Emergent Ambulance [facility] EMS General Medicine Emergency    [address] INJURY        Chief Complaint   Patient presents with    Hip Pain     Patient comes to the ER with report of having left hip pain and a fever  Patient had a hip replacement done in [facility] on 12/3/20 (originally replaced 4 years ago)  Assessment/Plan: this is a 77year old female from home to ED via ems admitted inpatient due to cellulitis of LUE/UTI   Recent hip replacement  And on azithromycin as prophylaxis, history of RA on methotrexate, Plaquenil and rituximab  Presented due to fever and hip pain starting about 2 days prior to arrival     On exam There is a 3-4 cm circular patch of erythema just distal to the left greater trochanter grossly firm  Febrile  UA moderate bacteria   9  Xray left hip showed sclerosis surrounding left acetabular prosthesis  Blood cultures done  In the ED given rocephin 1 gram IV, tylenol  Plan is continue Rocephin and add IV vancomycin  Na 132 and IVF in progress         On 1/26/2021: Erythema persists and is improving  On exam left thigh erythema  To continue on antibiotics  ED Triage Vitals   Temperature Pulse Respirations Blood Pressure SpO2   01/25/21 1607 01/25/21 1607 01/25/21 1607 01/25/21 1607 01/25/21 1607   (!) 103 1 °F (39 5 °C) 97 18 118/59 100 %      Temp Source Heart Rate Source Patient Position - Orthostatic VS BP Location FiO2 (%)   01/25/21 1607 01/25/21 1924 01/25/21 1607 01/25/21 1607 --   Oral Monitor Lying Right arm       Pain Score       01/25/21 1607       5          Wt Readings from Last 1 Encounters:   01/25/21 66 7 kg (147 lb)     Additional Vital Signs:   01/26/21 1032  98 1 °F (36 7 °C)  85  18  104/53  70  96 %  None (Room air)  Sitting   01/25/21 2040  98 7 °F (37 1 °C)  83  14  103/56  72  --  --  Lying   01/25/21 1930  --  92  13  121/59  84  96 %  --  --   01/25/21 1924  102 °F (38 9 °C)Abnormal   96  18  129/59  --  95 %  None (Room air)  Lying   01/25/21 1830  --  93  19  118/59  82  96 %  None (Room air)  Lying   01/25/21 1800  --  99  20  145/64  92  97 %  None (Room air)         Pertinent Labs/Diagnostic Test Results:   1/25/2021 Xray left hip Sclerosis surrounding the left acetabular prosthesis, indeterminate  If available, correlation with previous examinations would be useful  No acute fracture or malalignment  1/25/2021 Xray left femur No acute osseous abnormality identified in mid to distal femur      Results from last 7 days   Lab Units 01/26/21  0518 01/25/21  1621   WBC Thousand/uL 5 92 6 12   HEMOGLOBIN g/dL 9 8* 11 4*   HEMATOCRIT % 31 3* 36 2   PLATELETS Thousands/uL 242 290   NEUTROS ABS Thousands/µL 3 81 4 76     Results from last 7 days   Lab Units 01/26/21  0518 01/25/21  1621   SODIUM mmol/L 133* 132*   POTASSIUM mmol/L 3 8 3 7   CHLORIDE mmol/L 100 98*   CO2 mmol/L 26 25   ANION GAP mmol/L 7 9   BUN mg/dL 15 22   CREATININE mg/dL 0 81 0 81   EGFR ml/min/1 73sq m 75 75   CALCIUM mg/dL 8 9 9 1     Results from last 7 days   Lab Units 01/25/21  1621   AST U/L 17   ALT U/L 20   ALK PHOS U/L 98   TOTAL PROTEIN g/dL 7 1   ALBUMIN g/dL 2 9*   TOTAL BILIRUBIN mg/dL 0 40     Results from last 7 days   Lab Units 01/26/21  0518 01/25/21  1621   GLUCOSE RANDOM mg/dL 108 95     Results from last 7 days   Lab Units 01/25/21  1621   PROTIME seconds 14 4   INR  1 12   PTT seconds 36     Results from last 7 days   Lab Units 01/26/21  0518 01/25/21  1621   PROCALCITONIN ng/ml 0 15 0 18     Results from last 7 days   Lab Units 01/25/21  1621   LACTIC ACID mmol/L 0 9     Results from last 7 days   Lab Units 01/25/21  1621   CRP mg/L 177 9*     Results from last 7 days   Lab Units 01/25/21  1635   CLARITY UA  Slightly Cloudy   COLOR UA  Yellow   SPEC GRAV UA  1 020   PH UA  >=9 0*   GLUCOSE UA mg/dl Negative   KETONES UA mg/dl Negative   BLOOD UA  Trace-Intact*   PROTEIN UA mg/dl 30 (1+)*   NITRITE UA  Negative   BILIRUBIN UA  Negative   UROBILINOGEN UA E U /dl 0 2   LEUKOCYTES UA  Small*   WBC UA /hpf 10-20*   RBC UA /hpf 1-2   BACTERIA UA /hpf Moderate*   EPITHELIAL CELLS WET PREP /hpf Occasional   MUCUS THREADS  None Seen     Results from last 7 days   Lab Units 01/25/21  1622 01/25/21  1621   BLOOD CULTURE  Received in Microbiology Lab  Culture in Progress  Received in Microbiology Lab  Culture in Progress         ED Treatment:   Medication Administration from 01/25/2021 1558 to 01/25/2021 2034       Date/Time Order Dose Route Action Comments     01/25/2021 1821 cefTRIAXone (ROCEPHIN) IVPB (premix in dextrose) 1,000 mg 50 mL 1,000 mg Intravenous New Bag      01/25/2021 1821 acetaminophen (TYLENOL) tablet 650 mg 650 mg Oral Given         Past Medical History:   Diagnosis Date    Chronic pain     Rheumatoid [address] ([facility] [address] )      Present on Admission:  **None**      Admitting Diagnosis: Hip pain [M25 559]  Urinary tract infection [N39 0]  Cellulitis of left thigh [L03 116]  Sepsis ([facility] [address] ) [A41 9]  Age/Sex: 77 y o  female  Admission Orders:  1/25/2021 1834 inpatient Scheduled Medications:  amitriptyline, 100 mg, Oral, HS  aspirin, 325 mg, Oral, Daily  cefTRIAXone, 1,000 mg, Intravenous, Q24H  docusate sodium, 200 mg, Oral, HS  enoxaparin, 40 mg, Subcutaneous, Daily  folic acid, 211 mcg, Oral, BID  gabapentin, 300 mg, Oral, BID  hydroxychloroquine, 200 mg, Oral, BID  pramipexole, 0 75 mg, Oral, 4x Daily  vancomycin, 12 5 mg/kg, Intravenous, Q12H      Continuous IV Infusions:sodium chloride 0 9 % infusion   Rate: 75 mL/hr Dose: 75 mL/hr  Freq: Continuous Route: IV  Indications of Use: IV Hydration  Last Dose: Stopped (01/26/21 0818)  Start: 01/25/21 2045 End: 01/26/21 0926     PRN Meds:  acetaminophen, 650 mg, Oral, Q6H PRN  baclofen, 10 mg, Oral, TID PRN - used x 1 on 1/26/2021   ondansetron, 4 mg, Intravenous, Q6H PRN          Network Utilization Review Department  ATTENTION: Please call with any questions or concerns to [phone] and carefully listen to the prompts so that you are directed to the right person  All voicemails are confidential   Chrystine Can all requests for admission clinical reviews, approved or denied determinations and any other requests to dedicated fax number below belonging to the campus where the patient is receiving treatment   List of dedicated fax numbers for the Facilities:  [address] DENIALS (Administrative/Medical Necessity) [phone]   [address] (Maternity/NICU/Pediatrics) [phone]   [address] [address] [facility] [address] Nicola [address] [address] [facility] [address] [place] [address] [place] [address] [phone]

## 2021-01-26 NOTE — CASE MANAGEMENT
LOS: 1, URR score: 11 and [place], Pt I snot a 30 day re-admit or Medicare bundle  CM met with pt at bedside and explained role  Pt lives with her son Tommy Floyd in a 2 story home; [address], 12 steps inside  Pt uses cane to ambulate  Also has R/W, commode, w/c at home  Pt reports she is completely independent with ADLs  She is able to drive  She recently completed [place]  services through [employer]  She is scheduled to begin OP PT at [facility] with Dr Amelie Reid this week; first appointment Thursday 1/28  Pt has been driving herself to same  Pt PCP is Dr Liana Grimes  She sues [facility] for medications and denies any barriers to obtaining them  Pt denies any history of STR, MH and D&A treatment  She denies any other current discharge needs  She would like to begin services with OP PT on d/c  Her son will transport her home  CM to follow  CM reviewed d/c planning process including the following: identifying help at home, patient preference for d/c planning needs, availability of treatment team to discuss questions or concerns patient and/or family may have regarding understanding medications and recognizing signs and symptoms once discharged  CM also encouraged patient to follow up with all recommended appointments after discharge  Patient advised of importance for patient and family to participate in managing patients medical well being

## 2021-01-26 NOTE — PLAN OF CARE
Problem: INFECTION - ADULT  Goal: Absence or prevention of progression during hospitalization  Description: INTERVENTIONS:  - Assess and monitor for signs and symptoms of infection  - Monitor lab/diagnostic results  - Monitor all insertion sites, i e  indwelling lines, tubes, and drains  - Monitor endotracheal if appropriate and nasal secretions for changes in amount and color  - [place] appropriate cooling/warming therapies per order  - Administer medications as ordered  - Instruct and encourage patient and family to use good hand hygiene technique  - Identify and instruct in appropriate isolation precautions for identified infection/condition  Outcome: Progressing  Goal: Absence of fever/infection during neutropenic period  Description: INTERVENTIONS:  - Monitor WBC    Outcome: Progressing     Problem: SAFETY ADULT  Goal: Patient will remain free of falls  Description: INTERVENTIONS:  - Assess patient frequently for physical needs  -  Identify cognitive and physical deficits and behaviors that affect risk of falls    -  [place] fall precautions as indicated by assessment   - Educate patient/family on patient safety including physical limitations  - Instruct patient to call for assistance with activity based on assessment  - Modify environment to reduce risk of injury  - Consider OT/PT consult to assist with strengthening/mobility  Outcome: Progressing  Goal: Maintain or return to baseline ADL function  Description: INTERVENTIONS:  -  Assess patient's ability to carry out ADLs; assess patient's baseline for ADL function and identify physical deficits which impact ability to perform ADLs (bathing, care of mouth/teeth, toileting, grooming, dressing, etc )  - Assess/evaluate cause of self-care deficits   - Assess range of motion  - Assess patient's mobility; develop plan if impaired  - Assess patient's need for assistive devices and provide as appropriate  - Encourage maximum independence but intervene and supervise when necessary  - Involve family in performance of ADLs  - Assess for home care needs following discharge   - Consider OT consult to assist with ADL evaluation and planning for discharge  - Provide patient education as appropriate  Outcome: Progressing  Goal: Maintain or return mobility status to optimal level  Description: INTERVENTIONS:  - Assess patient's baseline mobility status (ambulation, transfers, stairs, etc )    - Identify cognitive and physical deficits and behaviors that affect mobility  - Identify mobility aids required to assist with transfers and/or ambulation (gait belt, sit-to-stand, lift, walker, cane, etc )  - [place] fall precautions as indicated by assessment  - Record patient progress and toleration of activity level on Mobility SBAR; progress patient to next Phase/Stage  - Instruct patient to call for assistance with activity based on assessment  - Consider rehabilitation consult to assist with strengthening/weightbearing, etc   Outcome: Progressing     Problem: SKIN/TISSUE INTEGRITY - ADULT  Goal: Skin integrity remains intact  Description: INTERVENTIONS  - Identify patients at risk for skin breakdown  - Assess and monitor skin integrity  - Assess and monitor nutrition and hydration status  - Monitor labs (i e  albumin)  - Assess for incontinence   - Turn and reposition patient  - Assist with mobility/ambulation  - Relieve pressure over bony prominences  - Avoid friction and shearing  - Provide appropriate hygiene as needed including keeping skin clean and dry  - Evaluate need for skin moisturizer/barrier cream  - Collaborate with interdisciplinary team (i e  Nutrition, Rehabilitation, etc )   - Patient/family teaching  Outcome: Progressing  Goal: Incision(s), wounds(s) or drain site(s) healing without S/S of infection  Description: INTERVENTIONS  - Assess and document risk factors for skin impairment   - Assess and document dressing, incision, wound bed, drain sites and surrounding tissue  - Consider nutrition services referral as needed  - Oral mucous membranes remain intact  - Provide patient/ family education  Outcome: Progressing  Goal: Oral mucous membranes remain intact  Description: INTERVENTIONS  - Assess oral mucosa and hygiene practices  - Implement preventative oral hygiene regimen  - Implement oral medicated treatments as ordered  - Initiate Nutrition services referral as needed  Outcome: Progressing

## 2021-01-26 NOTE — ASSESSMENT & PLAN NOTE
· Hemoglobin 11 4 on admission  · Baseline Hb 7 9  · No signs of active bleeding   · Keep Hb > 7  · Trend CBC

## 2021-01-26 NOTE — ASSESSMENT & PLAN NOTE
· Surgery performed last month at [facility] [place]   · Reports great recovery over the last month  Has had no pain, redness, drainage or problems from surgical site  · Reports she was placed on azithromcyin 250 mg daily as prophylaxis after the surgery that she is to continue for the next two months   · Hold while in the hospital while on ceftriaxone and vancomycin   · Has full ROM with left leg   · Follow results of left pelvic, hip and femur XRAY

## 2021-01-26 NOTE — PLAN OF CARE
Problem: INFECTION - ADULT  Goal: Absence or prevention of progression during hospitalization  Description: INTERVENTIONS:  - Assess and monitor for signs and symptoms of infection  - Monitor lab/diagnostic results  - Monitor all insertion sites, i e  indwelling lines, tubes, and drains  - Monitor endotracheal if appropriate and nasal secretions for changes in amount and color  - [place] appropriate cooling/warming therapies per order  - Administer medications as ordered  - Instruct and encourage patient and family to use good hand hygiene technique  - Identify and instruct in appropriate isolation precautions for identified infection/condition  Outcome: Progressing  Goal: Absence of fever/infection during neutropenic period  Description: INTERVENTIONS:  - Monitor WBC    Outcome: Progressing     Problem: SAFETY ADULT  Goal: Patient will remain free of falls  Description: INTERVENTIONS:  - Assess patient frequently for physical needs  -  Identify cognitive and physical deficits and behaviors that affect risk of falls    -  [place] fall precautions as indicated by assessment   - Educate patient/family on patient safety including physical limitations  - Instruct patient to call for assistance with activity based on assessment  - Modify environment to reduce risk of injury  - Consider OT/PT consult to assist with strengthening/mobility  Outcome: Progressing  Goal: Maintain or return to baseline ADL function  Description: INTERVENTIONS:  -  Assess patient's ability to carry out ADLs; assess patient's baseline for ADL function and identify physical deficits which impact ability to perform ADLs (bathing, care of mouth/teeth, toileting, grooming, dressing, etc )  - Assess/evaluate cause of self-care deficits   - Assess range of motion  - Assess patient's mobility; develop plan if impaired  - Assess patient's need for assistive devices and provide as appropriate  - Encourage maximum independence but intervene and supervise when necessary  - Involve family in performance of ADLs  - Assess for home care needs following discharge   - Consider OT consult to assist with ADL evaluation and planning for discharge  - Provide patient education as appropriate  Outcome: Progressing  Goal: Maintain or return mobility status to optimal level  Description: INTERVENTIONS:  - Assess patient's baseline mobility status (ambulation, transfers, stairs, etc )    - Identify cognitive and physical deficits and behaviors that affect mobility  - Identify mobility aids required to assist with transfers and/or ambulation (gait belt, sit-to-stand, lift, walker, cane, etc )  - [place] fall precautions as indicated by assessment  - Record patient progress and toleration of activity level on Mobility SBAR; progress patient to next Phase/Stage  - Instruct patient to call for assistance with activity based on assessment  - Consider rehabilitation consult to assist with strengthening/weightbearing, etc   Outcome: Progressing     Problem: SKIN/TISSUE INTEGRITY - ADULT  Goal: Skin integrity remains intact  Description: INTERVENTIONS  - Identify patients at risk for skin breakdown  - Assess and monitor skin integrity  - Assess and monitor nutrition and hydration status  - Monitor labs (i e  albumin)  - Assess for incontinence   - Turn and reposition patient  - Assist with mobility/ambulation  - Relieve pressure over bony prominences  - Avoid friction and shearing  - Provide appropriate hygiene as needed including keeping skin clean and dry  - Evaluate need for skin moisturizer/barrier cream  - Collaborate with interdisciplinary team (i e  Nutrition, Rehabilitation, etc )   - Patient/family teaching  Outcome: Progressing  Goal: Incision(s), wounds(s) or drain site(s) healing without S/S of infection  Description: INTERVENTIONS  - Assess and document risk factors for skin impairment   - Assess and document dressing, incision, wound bed, drain sites and surrounding tissue  - Consider nutrition services referral as needed  - Oral mucous membranes remain intact  - Provide patient/ family education  Outcome: Progressing  Goal: Oral mucous membranes remain intact  Description: INTERVENTIONS  - Assess oral mucosa and hygiene practices  - Implement preventative oral hygiene regimen  - Implement oral medicated treatments as ordered  - Initiate Nutrition services referral as needed  Outcome: Progressing     Problem: MUSCULOSKELETAL - ADULT  Goal: Maintain or return mobility to safest level of function  Description: INTERVENTIONS:  - Assess patient's ability to carry out ADLs; assess patient's baseline for ADL function and identify physical deficits which impact ability to perform ADLs (bathing, care of mouth/teeth, toileting, grooming, dressing, etc )  - Assess/evaluate cause of self-care deficits   - Assess range of motion  - Assess patient's mobility  - Assess patient's need for assistive devices and provide as appropriate  - Encourage maximum independence but intervene and supervise when necessary  - Involve family in performance of ADLs  - Assess for home care needs following discharge   - Consider OT consult to assist with ADL evaluation and planning for discharge  - Provide patient education as appropriate  Outcome: Progressing  Goal: Maintain proper alignment of affected body part  Description: INTERVENTIONS:  - Support, maintain and protect limb and body alignment  - Provide patient/ family with appropriate education  Outcome: Progressing     Problem: Potential for Falls  Goal: Patient will remain free of falls  Description: INTERVENTIONS:  - Assess patient frequently for physical needs  -  Identify cognitive and physical deficits and behaviors that affect risk of falls    -  [place] fall precautions as indicated by assessment   - Educate patient/family on patient safety including physical limitations  - Instruct patient to call for assistance with activity based on assessment  - Modify environment to reduce risk of injury  - Consider OT/PT consult to assist with strengthening/mobility  Outcome: Progressing

## 2021-01-26 NOTE — ED PROVIDER NOTES
History  Chief Complaint   Patient presents with    Hip Pain     Patient comes to the ER with report of having left hip pain and a fever  Patient had a hip replacement done in [place] on 12/3/20 (originally replaced 4 years ago)  77 female with history of arthritis on methotrexate Plaquenil, and rituximab presents to the emergency department for evaluation sudden onset fever hip pain beginning 2 days ago  The patient is approximately 6 weeks status post left hip revision  She apparently had a prior hip replacement done outside hospital 4 years ago, began having difficulties over the summer of 2020 and September required an operative washout with 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 Taking?    CRANBERRY PO 1/25/2021 at Unknown time Self Yes Yes   Sig: Take 4,200 mg by mouth 4 (four) times a day    Cholecalciferol (VITAMIN D3) 1000 UNITS CAPS 1/25/2021 at Unknown time Self Yes Yes   Sig: Take 2 capsules by mouth 2 (two) times a day    Magnesium 500 MG CAPS Not Taking at Unknown time Self Yes No   Sig: Take 1 capsule by mouth daily   amitriptyline (ELAVIL) 75 mg tablet 1/24/2021 at Unknown time Self Yes Yes   Sig: Take 100 mg by mouth daily at bedtime    aspirin 325 mg tablet   Yes Yes   Sig: Take 325 mg by mouth daily   baclofen 10 mg tablet   Yes Yes   Sig: Take 10 mg by mouth daily at bedtime   carisoprodol (SOMA) 350 mg tablet Not Taking at Unknown time Self Yes No   Sig: Take 700 mg by mouth daily at bedtime   cycloSPORINE non-modified (SandIMMUNE) 100 mg capsule Not Taking at Unknown time Self Yes No   Sig: Take 100 mg by mouth daily   docusate sodium (COLACE) 100 mg capsule   Yes Yes   Sig: Take 200 mg by mouth daily at bedtime   famotidine (PEPCID) 20 mg tablet Not Taking at Unknown time Self Yes No   Sig: Take 20 mg by mouth 2 (two) times a day   folic acid (FOLVITE) 946 MCG tablet   Yes Yes   Sig: Take 800 mcg by mouth 2 (two) times a day   gabapentin (NEURONTIN) 300 mg capsule 1/25/2021 at Unknown time Self Yes Yes   Sig: Take 300 mg by mouth 2 (two) times a day   hydroxychloroquine (PLAQUENIL) 200 mg tablet 1/24/2021 at Unknown time Self Yes Yes   Sig: Take 200 mg by mouth daily 3 Tablets at HS   methotrexate 2 5 mg tablet Past Week at Unknown time Self Yes Yes   Sig: Take 2 5 mg by mouth once a week 8 Tablets weekly   pramipexole (MIRAPEX) 0 25 mg tablet 1/25/2021 at Unknown time Self Yes Yes   Sig: Take 0 75 mg by mouth 4 (four) times a day       Facility-Administered Medications: None       Past Medical History:   Diagnosis Date    Chronic pain     Rheumatoid arthritis (HCC)        Past Surgical History:   Procedure Laterality Date    CARPAL TUNNEL RELEASE Bilateral     HIP OPEN REDUCTION Right     HIP SURGERY Left     HYSTERECTOMY      TONSILLECTOMY         History reviewed  No pertinent family history  I have reviewed and agree with the history as documented  E-Cigarette/Vaping     E-Cigarette/Vaping Substances     Social History     Tobacco Use    Smoking status: Former Smoker   Substance Use Topics    Alcohol use: No    Drug use: No       Review of Systems   Constitutional: Positive for chills, diaphoresis and fever  Eyes: Negative for visual disturbance  Respiratory: Negative for cough and shortness of breath  Cardiovascular: Negative for chest pain and palpitations     Gastrointestinal: Negative for abdominal pain, diarrhea, nausea and vomiting  Genitourinary: Positive for dysuria  Negative for flank pain and frequency  Musculoskeletal: Positive for arthralgias  Negative for myalgias  Skin: Negative for color change, rash and wound  Allergic/Immunologic: Negative for immunocompromised state  Neurological: Negative for dizziness and light-headedness  Hematological: Does not bruise/bleed easily  Psychiatric/Behavioral: Negative for confusion  The patient is not nervous/anxious  Physical Exam  Physical Exam  Vitals signs reviewed  Constitutional:       General: She is not in acute distress  Appearance: She is well-developed  She is not diaphoretic  HENT:      Head: Normocephalic and atraumatic  Mouth/Throat:      Mouth: Mucous membranes are moist    Eyes:      General: No scleral icterus  Pupils: Pupils are equal, round, and reactive to light  Neck:      Vascular: No JVD  Cardiovascular:      Rate and Rhythm: Normal rate and regular rhythm  Heart sounds: No murmur  No friction rub  No gallop  Pulmonary:      Effort: No respiratory distress  Breath sounds: No wheezing or rales  Abdominal:      General: Bowel sounds are normal  There is no distension  Palpations: Abdomen is soft  There is no mass  Tenderness: There is no abdominal tenderness  There is no guarding or rebound  Musculoskeletal:      Comments: Left hip range of motion is within normal limits without pain  There is a 3-4 cm circular patch of erythema just distal to the left greater trochanter grossly firm without fluctuance, tender to palpation  Skin:     General: Skin is warm and dry  Capillary Refill: Capillary refill takes less than 2 seconds  Coloration: Skin is not pale  Neurological:      Mental Status: She is alert and oriented to person, place, and time     Psychiatric:         Mood and Affect: Mood normal          Behavior: Behavior normal          Vital Signs  ED Triage Vitals [01/25/21 1607]   Temperature Pulse Respirations Blood Pressure SpO2   (!) 103 1 °F (39 5 °C) 97 18 118/59 100 %      Temp Source Heart Rate Source Patient Position - Orthostatic VS BP Location FiO2 (%)   Oral -- Lying Right arm --      Pain Score       5           Vitals:    01/25/21 1607 01/25/21 1730 01/25/21 1800 01/25/21 1830   BP: 118/59 127/59 145/64 118/59   Pulse: 97 93 99 93   Patient Position - Orthostatic VS: Lying Lying  Lying         Visual Acuity      ED Medications  Medications   cefTRIAXone (ROCEPHIN) IVPB (premix in dextrose) 1,000 mg 50 mL (1,000 mg Intravenous New Bag 1/25/21 1821)   acetaminophen (TYLENOL) tablet 650 mg (650 mg Oral Given 1/25/21 1821)       Diagnostic Studies  Results Reviewed     Procedure Component Value Units Date/Time    Urine Microscopic [[id number]]  (Abnormal) Collected: 01/25/21 1635    Lab Status: Final result Specimen: Urine, Clean Catch Updated: 01/25/21 1816     RBC, UA 1-2 /hpf      WBC, UA 10-20 /hpf      Epithelial Cells Occasional /hpf      Bacteria, UA Moderate /hpf      MUCUS THREADS None Seen    Lactic acid [[id number]]  (Normal) Collected: 01/25/21 1621    Lab Status: Final result Specimen: Blood from Arm, Left Updated: 01/25/21 1653     LACTIC ACID 0 9 mmol/L     Narrative:      Result may be elevated if tourniquet was used during collection      Comprehensive metabolic panel [[id number]]  (Abnormal) Collected: 01/25/21 1621    Lab Status: Final result Specimen: Blood from Arm, Left Updated: 01/25/21 1650     Sodium 132 mmol/L      Potassium 3 7 mmol/L      Chloride 98 mmol/L      CO2 25 mmol/L      ANION GAP 9 mmol/L      BUN 22 mg/dL      Creatinine 0 81 mg/dL      Glucose 95 mg/dL      Calcium 9 1 mg/dL      Corrected Calcium 10 0 mg/dL      AST 17 U/L      ALT 20 U/L      Alkaline Phosphatase 98 U/L      Total Protein 7 1 g/dL      Albumin 2 9 g/dL      Total Bilirubin 0 40 mg/dL      eGFR 75 ml/min/1 73sq m     Narrative:      National Kidney Disease Foundation guidelines for Chronic Kidney Disease (CKD):     Stage 1 with normal or high GFR (GFR > 90 mL/min/1 73 square meters)    Stage 2 Mild CKD (GFR = 60-89 mL/min/1 73 square meters)    Stage 3A Moderate CKD (GFR = 45-59 mL/min/1 73 square meters)    Stage 3B Moderate CKD (GFR = 30-44 mL/min/1 73 square meters)    Stage 4 Severe CKD (GFR = 15-29 mL/min/1 73 square meters)    Stage 5 End Stage CKD (GFR <15 mL/min/1 73 square meters)  Note: GFR calculation is accurate only with a steady state creatinine    C-reactive protein [[id number]]  (Abnormal) Collected: 01/25/21 1621    Lab Status: Final result Specimen: Blood from Arm, Left Updated: 01/25/21 1650      9 mg/L     UA (URINE) with reflex to Scope [[id number]]  (Abnormal) Collected: 01/25/21 1635    Lab Status: Final result Specimen: Urine, Clean Catch Updated: 01/25/21 1646     Color, UA Yellow     Clarity, UA Slightly Cloudy     Specific [place], UA 1 020     pH, UA >=9 0     Leukocytes, UA Small     Nitrite, UA Negative     Protein, UA 30 (1+) mg/dl      Glucose, UA Negative mg/dl      Ketones, UA Negative mg/dl      Urobilinogen, UA 0 2 E U /dl      Bilirubin, UA Negative     Blood, UA Trace-Intact    Protime-INR [[id number]]  (Normal) Collected: 01/25/21 1621    Lab Status: Final result Specimen: Blood from Arm, Left Updated: 01/25/21 1646     Protime 14 4 seconds      INR 1 12    APTT [[id number]]  (Normal) Collected: 01/25/21 1621    Lab Status: Final result Specimen: Blood from Arm, Left Updated: 01/25/21 1646     PTT 36 seconds     CBC and differential [[id number]]  (Abnormal) Collected: 01/25/21 1621    Lab Status: Final result Specimen: Blood from Arm, Left Updated: 01/25/21 1631     WBC 6 12 Thousand/uL      RBC 3 67 Million/uL      Hemoglobin 11 4 g/dL      Hematocrit 36 2 %      MCV 99 fL      MCH 31 1 pg      MCHC 31 5 g/dL      RDW 15 0 %      MPV 9 1 fL      Platelets 200 Thousands/uL      nRBC 0 /100 WBCs      Neutrophils Relative 77 % Immat GRANS % 1 %      Lymphocytes Relative 9 %      Monocytes Relative 13 %      Eosinophils Relative 0 %      Basophils Relative 0 %      Neutrophils Absolute 4 76 Thousands/µL      Immature Grans Absolute 0 04 Thousand/uL      Lymphocytes Absolute 0 52 Thousands/µL      Monocytes Absolute 0 77 Thousand/µL      Eosinophils Absolute 0 01 Thousand/µL      Basophils Absolute 0 02 Thousands/µL     Blood culture #2 [[id number]] Collected: 01/25/21 1621    Lab Status: In process Specimen: Blood from Arm, Left Updated: 01/25/21 1629    Procalcitonin with AM Reflex [[id number]] Collected: 01/25/21 1621    Lab Status: In process Specimen: Blood from Arm, Left Updated: 01/25/21 1628    Blood culture #1 [[id number]] Collected: 01/25/21 1622    Lab Status: In process Specimen: Blood from Arm, Left Updated: 01/25/21 1628                 No orders to display              Procedures  Procedures         ED Course  ED Course as of Jan 25 1921   Mon Jan 25, 2021 1810 Paged pt's orthopedist at [facility], Dr Kal Clements, for discussion  Do not feel clinically that she has intra-articular infection  [phone] direct number for pt's Ortho doc Dr Maureen Lamar Name 01/25/21 1614                Is the patient's history suggestive of a new or worsening infection? (!) Yes (Proceed)  -DK        Suspected source of infection  infected joint;urinary tract infection  -DK        Are two or more of the following signs & symptoms of infection both present and new to the patient? (!) Yes (Proceed)  -DK        Indicate SIRS criteria  Hyperthemia > 38 3C (100 9F); Tachycardia > 90 bpm  -DK        If the answer is yes to both questions, suspicion of sepsis is present  --        If severe sepsis is present AND tissue hypoperfusion perists in the hour after fluid resuscitation or lactate > 4, the patient meets criteria for SEPTIC SHOCK  --        Are any of the following organ dysfunction criteria present within 6 hours of suspected infection and SIRS criteria that are NOT considered to be chronic conditions? No  -DK        Organ dysfunction  --        Date of presentation of severe sepsis  --        Time of presentation of severe sepsis  --        Tissue hypoperfusion persists in the hour after crystalloid fluid administration, evidenced, by either:  --        Was hypotension present within one hour of the conclusion of crystalloid fluid administration? No  -DK        Date of presentation of septic shock  --        Time of presentation of septic shock  --          User Key  (r) = Recorded By, (t) = Taken By, (c) = Cosigned By    [address] Name Provider Type    ALEX Wallis PA-C Physician Assistant                        MDM  Number of Diagnoses or Management Options  Cellulitis of left thigh: new and requires workup  Sepsis [facility]): new and requires workup  Urinary tract infection: new and requires workup  Diagnosis management comments:  Left thigh cellulitis does not appear to be intra-articular given normal hip range of motion without pain, markedly elevated CRP is concerning  Patient also apparently has a UTI based on UA results  Although she has no leukocytosis, she is on 3 immunosuppressive medications and thus will benefit from IV antibiotics and close observation  I did discuss the case with Dr Dominique Edouard at [facility], he agrees that intra-articular/prosthesis infection is not likely          Amount and/or Complexity of Data Reviewed  Clinical lab tests: ordered and reviewed  Tests in the medicine section of CPT®: ordered and reviewed  Decide to obtain previous medical records or to obtain history from someone other than the patient: yes  Obtain history from someone other than the patient: yes  Review and summarize past medical records: yes  Discuss the patient with other providers: yes        Disposition  Final diagnoses:   Cellulitis of left thigh   Urinary tract infection Sepsis ([facility] [address] )     Time reflects when diagnosis was documented in both MDM as applicable and the Disposition within this note     Time User Action Codes Description Comment    1/25/2021  6:34 PM [place] Mends Add [A31 143] Cellulitis of left thigh     1/25/2021  6:34 PM [place] Mends Add [N39 0] Urinary tract infection     1/25/2021  6:34 PM [place] Mends Add [A41 9] Sepsis [facility])       ED Disposition     ED Disposition Condition Date/Time Comment    Admit Stable Mon Jan 25, 2021  6:34 PM Case was discussed with Dr Lauren Whitney and the patient's admission status was agreed to be Admission Status: inpatient status to the service of Dr Lauren Whitney   Follow-up Information    None         Patient's Medications   Discharge Prescriptions    No medications on file     No discharge procedures on file      PDMP Review     None          ED Provider  Electronically Signed by           Napoleon Sommers PA-C  01/25/21 1921

## 2021-01-26 NOTE — ASSESSMENT & PLAN NOTE
· Urine:  · UA: small amount of leukocytes, nitrite negative   · Micro: WBC 10-20, Moderate amounts of bacteria   · Febrile at 103 1 in ED, Normal WBC, lactic negative   · Temperature now resolved after tylenol   · Blood cultures and procal pending   · Started on Ceftriaxone in ED for infection and tylenol for fever  · Continue with Ceftriaxone

## 2021-01-26 NOTE — OCCUPATIONAL THERAPY NOTE
Occupational Therapy Screen    Patient Name: Jd Marquez  QSOJO'H Date: 1/26/2021 01/26/21 1155   OT Last Visit   OT Visit Date 01/26/21   Note Type   Note type Screen   Assessment   Assessment OT orders received and chart reviewed  Per chart, pt resides at [facility], [address] [place] [address] with her son and is independent with ADL/IADL/driving  Pt uses cane for ambulation, but also has RW, commode, and w/c at home  Spoke to [employer] who states pt is currently functioning at an independent level and is at her baseline  Recommend pt continue to be OOB for meals, ambulation to/from BR, perform self care tasks, and mobility in hallway with nursing  At this time, OT recommendations at time of discharge are home with family support  No acute OT needs identified at this time; please re-consult if OT needs arise during remainder of hospital stay       [employer], MS, OTR/L

## 2021-01-26 NOTE — ASSESSMENT & PLAN NOTE
· Came into ED due to circular patch of erythema that appeared two days on 1/23 on her left upper thigh  Febrile 103 in ED  Reports fevers over the past two days  · 3-4 cm circular erythema patch with warmth  Tender to palpation  Patient able to ambulate and has full rom of left leg without pain  No drainage or fluctuance  · Normal WBC, Normal lactic  · In Ed started on ceftriaxone   Continue with ceftriaxone and add IV vancomycin

## 2021-01-27 VITALS
TEMPERATURE: 98.2 F | OXYGEN SATURATION: 100 % | HEIGHT: 64 IN | RESPIRATION RATE: 19 BRPM | WEIGHT: 147 LBS | DIASTOLIC BLOOD PRESSURE: 58 MMHG | HEART RATE: 82 BPM | BODY MASS INDEX: 25.1 KG/M2 | SYSTOLIC BLOOD PRESSURE: 103 MMHG

## 2021-01-27 LAB
ANION GAP SERPL CALCULATED.3IONS-SCNC: 9 MMOL/L (ref 4–13)
ATRIAL RATE: 91 BPM
BUN SERPL-MCNC: 13 MG/DL (ref 5–25)
CALCIUM SERPL-MCNC: 8.7 MG/DL (ref 8.3–10.1)
CHLORIDE SERPL-SCNC: 101 MMOL/L (ref 100–108)
CO2 SERPL-SCNC: 24 MMOL/L (ref 21–32)
CREAT SERPL-MCNC: 0.79 MG/DL (ref 0.6–1.3)
ERYTHROCYTE [DISTWIDTH] IN BLOOD BY AUTOMATED COUNT: 15 % (ref 11.6–15.1)
GFR SERPL CREATININE-BSD FRML MDRD: 78 ML/MIN/1.73SQ M
GLUCOSE SERPL-MCNC: 105 MG/DL (ref 65–140)
HCT VFR BLD AUTO: 29.9 % (ref 34.8–46.1)
HGB BLD-MCNC: 9.3 G/DL (ref 11.5–15.4)
MCH RBC QN AUTO: 30.9 PG (ref 26.8–34.3)
MCHC RBC AUTO-ENTMCNC: 31.1 G/DL (ref 31.4–37.4)
MCV RBC AUTO: 99 FL (ref 82–98)
P AXIS: 29 DEGREES
PLATELET # BLD AUTO: 238 THOUSANDS/UL (ref 149–390)
PMV BLD AUTO: 9.3 FL (ref 8.9–12.7)
POTASSIUM SERPL-SCNC: 3.5 MMOL/L (ref 3.5–5.3)
PR INTERVAL: 170 MS
QRS AXIS: 66 DEGREES
QRSD INTERVAL: 66 MS
QT INTERVAL: 330 MS
QTC INTERVAL: 405 MS
RBC # BLD AUTO: 3.01 MILLION/UL (ref 3.81–5.12)
SODIUM SERPL-SCNC: 134 MMOL/L (ref 136–145)
T WAVE AXIS: 57 DEGREES
VANCOMYCIN TROUGH SERPL-MCNC: 11 UG/ML (ref 10–20)
VENTRICULAR RATE: 91 BPM
WBC # BLD AUTO: 6.02 THOUSAND/UL (ref 4.31–10.16)

## 2021-01-27 PROCEDURE — 80048 BASIC METABOLIC PNL TOTAL CA: CPT | Performed by: INTERNAL MEDICINE

## 2021-01-27 PROCEDURE — 99239 HOSP IP/OBS DSCHRG MGMT >30: CPT | Performed by: INTERNAL MEDICINE

## 2021-01-27 PROCEDURE — 80202 ASSAY OF VANCOMYCIN: CPT | Performed by: INTERNAL MEDICINE

## 2021-01-27 PROCEDURE — 93010 ELECTROCARDIOGRAM REPORT: CPT | Performed by: INTERNAL MEDICINE

## 2021-01-27 PROCEDURE — 85027 COMPLETE CBC AUTOMATED: CPT | Performed by: INTERNAL MEDICINE

## 2021-01-27 RX ORDER — DOXYCYCLINE HYCLATE 100 MG/1
100 CAPSULE ORAL EVERY 12 HOURS SCHEDULED
Qty: 23 CAPSULE | Refills: 0 | Status: SHIPPED | OUTPATIENT
Start: 2021-01-27 | End: 2021-02-08

## 2021-01-27 RX ORDER — CEFTRIAXONE 1 G/50ML
1000 INJECTION, SOLUTION INTRAVENOUS ONCE
Status: COMPLETED | OUTPATIENT
Start: 2021-01-27 | End: 2021-01-27

## 2021-01-27 RX ORDER — DOXYCYCLINE HYCLATE 100 MG/1
100 CAPSULE ORAL EVERY 12 HOURS SCHEDULED
Status: DISCONTINUED | OUTPATIENT
Start: 2021-01-27 | End: 2021-01-27 | Stop reason: HOSPADM

## 2021-01-27 RX ADMIN — PRAMIPEXOLE DIHYDROCHLORIDE 0.75 MG: 0.5 TABLET ORAL at 08:02

## 2021-01-27 RX ADMIN — ENOXAPARIN SODIUM 40 MG: 40 INJECTION SUBCUTANEOUS at 08:02

## 2021-01-27 RX ADMIN — DOXYCYCLINE 100 MG: 100 CAPSULE ORAL at 11:07

## 2021-01-27 RX ADMIN — FOLIC ACID TAB 400 MCG 800 MCG: 400 TAB at 08:02

## 2021-01-27 RX ADMIN — PRAMIPEXOLE DIHYDROCHLORIDE 0.75 MG: 0.5 TABLET ORAL at 11:07

## 2021-01-27 RX ADMIN — HYDROXYCHLOROQUINE SULFATE 200 MG: 200 TABLET, FILM COATED ORAL at 08:03

## 2021-01-27 RX ADMIN — CEFTRIAXONE 1000 MG: 1 INJECTION, SOLUTION INTRAVENOUS at 11:07

## 2021-01-27 RX ADMIN — GABAPENTIN 300 MG: 300 CAPSULE ORAL at 08:02

## 2021-01-27 RX ADMIN — ASPIRIN 325 MG ORAL TABLET 325 MG: 325 PILL ORAL at 08:01

## 2021-01-27 RX ADMIN — VANCOMYCIN HYDROCHLORIDE 750 MG: 750 INJECTION, SOLUTION INTRAVENOUS at 08:40

## 2021-01-27 NOTE — PLAN OF CARE
Problem: INFECTION - ADULT  Goal: Absence or prevention of progression during hospitalization  Description: INTERVENTIONS:  - Assess and monitor for signs and symptoms of infection  - Monitor lab/diagnostic results  - Monitor all insertion sites, i e  indwelling lines, tubes, and drains  - Monitor endotracheal if appropriate and nasal secretions for changes in amount and color  - [place] appropriate cooling/warming therapies per order  - Administer medications as ordered  - Instruct and encourage patient and family to use good hand hygiene technique  - Identify and instruct in appropriate isolation precautions for identified infection/condition  Outcome: Progressing  Goal: Absence of fever/infection during neutropenic period  Description: INTERVENTIONS:  - Monitor WBC    Outcome: Progressing     Problem: SAFETY ADULT  Goal: Patient will remain free of falls  Description: INTERVENTIONS:  - Assess patient frequently for physical needs  -  Identify cognitive and physical deficits and behaviors that affect risk of falls    -  [place] fall precautions as indicated by assessment   - Educate patient/family on patient safety including physical limitations  - Instruct patient to call for assistance with activity based on assessment  - Modify environment to reduce risk of injury  - Consider OT/PT consult to assist with strengthening/mobility  Outcome: Progressing  Goal: Maintain or return to baseline ADL function  Description: INTERVENTIONS:  -  Assess patient's ability to carry out ADLs; assess patient's baseline for ADL function and identify physical deficits which impact ability to perform ADLs (bathing, care of mouth/teeth, toileting, grooming, dressing, etc )  - Assess/evaluate cause of self-care deficits   - Assess range of motion  - Assess patient's mobility; develop plan if impaired  - Assess patient's need for assistive devices and provide as appropriate  - Encourage maximum independence but intervene and supervise when necessary  - Involve family in performance of ADLs  - Assess for home care needs following discharge   - Consider OT consult to assist with ADL evaluation and planning for discharge  - Provide patient education as appropriate  Outcome: Progressing  Goal: Maintain or return mobility status to optimal level  Description: INTERVENTIONS:  - Assess patient's baseline mobility status (ambulation, transfers, stairs, etc )    - Identify cognitive and physical deficits and behaviors that affect mobility  - Identify mobility aids required to assist with transfers and/or ambulation (gait belt, sit-to-stand, lift, walker, cane, etc )  - [place] fall precautions as indicated by assessment  - Record patient progress and toleration of activity level on Mobility SBAR; progress patient to next Phase/Stage  - Instruct patient to call for assistance with activity based on assessment  - Consider rehabilitation consult to assist with strengthening/weightbearing, etc   Outcome: Progressing     Problem: SKIN/TISSUE INTEGRITY - ADULT  Goal: Skin integrity remains intact  Description: INTERVENTIONS  - Identify patients at risk for skin breakdown  - Assess and monitor skin integrity  - Assess and monitor nutrition and hydration status  - Monitor labs (i e  albumin)  - Assess for incontinence   - Turn and reposition patient  - Assist with mobility/ambulation  - Relieve pressure over bony prominences  - Avoid friction and shearing  - Provide appropriate hygiene as needed including keeping skin clean and dry  - Evaluate need for skin moisturizer/barrier cream  - Collaborate with interdisciplinary team (i e  Nutrition, Rehabilitation, etc )   - Patient/family teaching  Outcome: Progressing  Goal: Incision(s), wounds(s) or drain site(s) healing without S/S of infection  Description: INTERVENTIONS  - Assess and document risk factors for skin impairment   - Assess and document dressing, incision, wound bed, drain sites and surrounding tissue  - Consider nutrition services referral as needed  - Oral mucous membranes remain intact  - Provide patient/ family education  Outcome: Progressing  Goal: Oral mucous membranes remain intact  Description: INTERVENTIONS  - Assess oral mucosa and hygiene practices  - Implement preventative oral hygiene regimen  - Implement oral medicated treatments as ordered  - Initiate Nutrition services referral as needed  Outcome: Progressing     Problem: MUSCULOSKELETAL - ADULT  Goal: Maintain or return mobility to safest level of function  Description: INTERVENTIONS:  - Assess patient's ability to carry out ADLs; assess patient's baseline for ADL function and identify physical deficits which impact ability to perform ADLs (bathing, care of mouth/teeth, toileting, grooming, dressing, etc )  - Assess/evaluate cause of self-care deficits   - Assess range of motion  - Assess patient's mobility  - Assess patient's need for assistive devices and provide as appropriate  - Encourage maximum independence but intervene and supervise when necessary  - Involve family in performance of ADLs  - Assess for home care needs following discharge   - Consider OT consult to assist with ADL evaluation and planning for discharge  - Provide patient education as appropriate  Outcome: Progressing  Goal: Maintain proper alignment of affected body part  Description: INTERVENTIONS:  - Support, maintain and protect limb and body alignment  - Provide patient/ family with appropriate education  Outcome: Progressing     Problem: Potential for Falls  Goal: Patient will remain free of falls  Description: INTERVENTIONS:  - Assess patient frequently for physical needs  -  Identify cognitive and physical deficits and behaviors that affect risk of falls    -  [place] fall precautions as indicated by assessment   - Educate patient/family on patient safety including physical limitations  - Instruct patient to call for assistance with activity based on assessment  - Modify environment to reduce risk of injury  - Consider OT/PT consult to assist with strengthening/mobility  Outcome: Progressing

## 2021-01-27 NOTE — PLAN OF CARE
Problem: INFECTION - ADULT  Goal: Absence or prevention of progression during hospitalization  Description: INTERVENTIONS:  - Assess and monitor for signs and symptoms of infection  - Monitor lab/diagnostic results  - Monitor all insertion sites, i e  indwelling lines, tubes, and drains  - Monitor endotracheal if appropriate and nasal secretions for changes in amount and color  - [place] appropriate cooling/warming therapies per order  - Administer medications as ordered  - Instruct and encourage patient and family to use good hand hygiene technique  - Identify and instruct in appropriate isolation precautions for identified infection/condition  Outcome: Adequate for Discharge  Goal: Absence of fever/infection during neutropenic period  Description: INTERVENTIONS:  - Monitor WBC    Outcome: Adequate for Discharge     Problem: SAFETY ADULT  Goal: Patient will remain free of falls  Description: INTERVENTIONS:  - Assess patient frequently for physical needs  -  Identify cognitive and physical deficits and behaviors that affect risk of falls    -  [place] fall precautions as indicated by assessment   - Educate patient/family on patient safety including physical limitations  - Instruct patient to call for assistance with activity based on assessment  - Modify environment to reduce risk of injury  - Consider OT/PT consult to assist with strengthening/mobility  Outcome: Adequate for Discharge  Goal: Maintain or return to baseline ADL function  Description: INTERVENTIONS:  -  Assess patient's ability to carry out ADLs; assess patient's baseline for ADL function and identify physical deficits which impact ability to perform ADLs (bathing, care of mouth/teeth, toileting, grooming, dressing, etc )  - Assess/evaluate cause of self-care deficits   - Assess range of motion  - Assess patient's mobility; develop plan if impaired  - Assess patient's need for assistive devices and provide as appropriate  - Encourage maximum independence but intervene and supervise when necessary  - Involve family in performance of ADLs  - Assess for home care needs following discharge   - Consider OT consult to assist with ADL evaluation and planning for discharge  - Provide patient education as appropriate  Outcome: Adequate for Discharge  Goal: Maintain or return mobility status to optimal level  Description: INTERVENTIONS:  - Assess patient's baseline mobility status (ambulation, transfers, stairs, etc )    - Identify cognitive and physical deficits and behaviors that affect mobility  - Identify mobility aids required to assist with transfers and/or ambulation (gait belt, sit-to-stand, lift, walker, cane, etc )  - [place] fall precautions as indicated by assessment  - Record patient progress and toleration of activity level on Mobility SBAR; progress patient to next Phase/Stage  - Instruct patient to call for assistance with activity based on assessment  - Consider rehabilitation consult to assist with strengthening/weightbearing, etc   Outcome: Adequate for Discharge     Problem: SKIN/TISSUE INTEGRITY - ADULT  Goal: Skin integrity remains intact  Description: INTERVENTIONS  - Identify patients at risk for skin breakdown  - Assess and monitor skin integrity  - Assess and monitor nutrition and hydration status  - Monitor labs (i e  albumin)  - Assess for incontinence   - Turn and reposition patient  - Assist with mobility/ambulation  - Relieve pressure over bony prominences  - Avoid friction and shearing  - Provide appropriate hygiene as needed including keeping skin clean and dry  - Evaluate need for skin moisturizer/barrier cream  - Collaborate with interdisciplinary team (i e  Nutrition, Rehabilitation, etc )   - Patient/family teaching  Outcome: Adequate for Discharge  Goal: Incision(s), wounds(s) or drain site(s) healing without S/S of infection  Description: INTERVENTIONS  - Assess and document risk factors for skin impairment   - Assess and document dressing, incision, wound bed, drain sites and surrounding tissue  - Consider nutrition services referral as needed  - Oral mucous membranes remain intact  - Provide patient/ family education  Outcome: Adequate for Discharge  Goal: Oral mucous membranes remain intact  Description: INTERVENTIONS  - Assess oral mucosa and hygiene practices  - Implement preventative oral hygiene regimen  - Implement oral medicated treatments as ordered  - Initiate Nutrition services referral as needed  Outcome: Adequate for Discharge     Problem: MUSCULOSKELETAL - ADULT  Goal: Maintain or return mobility to safest level of function  Description: INTERVENTIONS:  - Assess patient's ability to carry out ADLs; assess patient's baseline for ADL function and identify physical deficits which impact ability to perform ADLs (bathing, care of mouth/teeth, toileting, grooming, dressing, etc )  - Assess/evaluate cause of self-care deficits   - Assess range of motion  - Assess patient's mobility  - Assess patient's need for assistive devices and provide as appropriate  - Encourage maximum independence but intervene and supervise when necessary  - Involve family in performance of ADLs  - Assess for home care needs following discharge   - Consider OT consult to assist with ADL evaluation and planning for discharge  - Provide patient education as appropriate  Outcome: Adequate for Discharge  Goal: Maintain proper alignment of affected body part  Description: INTERVENTIONS:  - Support, maintain and protect limb and body alignment  - Provide patient/ family with appropriate education  Outcome: Adequate for Discharge     Problem: Potential for Falls  Goal: Patient will remain free of falls  Description: INTERVENTIONS:  - Assess patient frequently for physical needs  -  Identify cognitive and physical deficits and behaviors that affect risk of falls    -  [place] fall precautions as indicated by assessment   - Educate patient/family on patient safety including physical limitations  - Instruct patient to call for assistance with activity based on assessment  - Modify environment to reduce risk of injury  - Consider OT/PT consult to assist with strengthening/mobility  Outcome: Adequate for Discharge

## 2021-01-27 NOTE — PROGRESS NOTES
Vancomycin IV Pharmacy-to-Dose Consultation    Hero Au is a 77 y o  female who is currently receiving Vancomycin IV with management by the Pharmacy Consult service  Assessment/Plan:  The patient was reviewed  Renal function is stable and no signs or symptoms of nephrotoxicity and/or infusion reactions were documented in the chart  Patient is currently on Vancomycin 750mg IV Q12H and most recent trough level drawn @ 0840 hrs on 1/27/21 is 11 ug/ml which is sub-therapeutic based on goal of 15-20  Based on today's assessment will change Vancomycin (Day #3) dosing to 1250mg IV Q12H, with a plan for trough to be drawn at 1530 on 1/28/21  We will continue to follow the patients culture results and clinical progress daily      Christie Nuñez, Pharmacist

## 2021-01-27 NOTE — ASSESSMENT & PLAN NOTE
Left thigh cellulitis, poa, noted to have 2 circular erythematous patches  · 3-4 cm circular erythema patch with warmth  Tender to palpation  Patient able to ambulate and has full ROM of left leg without pain  No drainage or fluctuance     · Currently on IV vancomycin for cellulitis, transition to oral doxycycline 100mg BID on discharge x 12days, to complete a 14day course  · Erythema improving  · Trend CBC and temp curve  · Tylenol prn for fevers

## 2021-01-27 NOTE — DISCHARGE INSTRUCTIONS
Cellulitis   WHAT YOU NEED TO KNOW:   Cellulitis is a skin infection caused by bacteria  Cellulitis may go away on its own or you may need treatment  Your healthcare provider may draw a [place] around the outside edges of your cellulitis  If your cellulitis spreads, your healthcare provider will see it outside of the [place]  WHILE YOU ARE HERE:   Informed consent  is a legal document that explains the tests, treatments, or procedures that you may need  Informed consent means you understand what will be done and can make decisions about what you want  You give your permission when you sign the consent form  You can have someone sign this form for you if you are not able to sign it  You have the right to understand your medical care in words you know  Before you sign the consent form, understand the risks and benefits of what will be done  Make sure all your questions are answered  An IV  is a small tube placed in your vein that is used to give you medicine or liquids  Vital signs:  Healthcare providers will check your blood pressure, heart rate, breathing rate, and temperature  They will also ask about your pain  Vital signs give information about your current health  Antibiotics  treat the bacterial infection  Tests:   · Blood tests  may show if your infection is getting better or worse  · An x-ray, ultrasound, CT, or MRI  may show if the infection has spread  You may be given contrast liquid to help the infection show up better in the pictures  Tell the healthcare provider if you have ever had an allergic reaction to contrast liquid  Do not enter the MRI room with anything metal  Metal can cause serious injury  Tell the healthcare provider if you have any metal in or on your body  Treatment:   · Abscess drainage  may be needed to help clean out the infection       · Debridement  is a procedure used to cut away damaged, dead, or infected tissue to help the wound heal     RISKS:   If your cellulitis is severe, you may be hospitalized  Your skin may swell and separate from the tissue and bone beneath it  You may have severe swelling in your arms or legs  Your lymph system may become damaged and increase your risk for more cellulitis infections  Your skin and nearby tissues may die and start to peel  Your infection may spread to your bone, blood, kidneys, and heart  This can be life-threatening  CARE AGREEMENT:   You have the right to help plan your care  Learn about your health condition and how it may be treated  Discuss treatment options with your healthcare providers to decide what care you want to receive  You always have the right to refuse treatment  © Copyright [address] Information is for End User's use only and may not be sold, redistributed or otherwise used for commercial purposes  All illustrations and images included in CareNotes® are the copyrighted property of A D A M , Inc  or Ángela Cheney  The above information is an  only  It is not intended as medical advice for individual conditions or treatments  Talk to your doctor, nurse or pharmacist before following any medical regimen to see if it is safe and effective for you

## 2021-01-27 NOTE — DISCHARGE INSTR - AVS FIRST PAGE
Dear Dayanara Lawson,     It was our pleasure to care for you here at [facility], [address] [place] [address]  It is our hope that we were always able to exceed the expected standards for your care during your stay  You were hospitalized due to left thigh cellulitis and UTI and you were treated with antoibiotics and you improved  You were cared for on the general medical floor under the service of Sameera Byrne MD with the [facility] who covers for your primary care physician (PCP), Hugo Byrd MD, while you were hospitalized  If you have any questions or concerns related to this hospitalization, you may contact us at [phone]  For follow up as well as medication refills, we recommend that you follow up with your primary care physician  A registered nurse will reach out to you by phone within a few days after your discharge to answer any additional questions that you may have after going home  However, at this time we provide for you here, the most important instructions / recommendations at discharge:     · Notable Medication Adjustments -   · Oral doxycycline 100mg BID x 12days  · Important follow up information -   · Follow up with your primary care doctor within 1 week  · Please review this entire after visit summary as additional general instructions including medication list, appointments, activity, diet, any pertinent wound care, and other additional recommendations from your care team that may be provided for you        Sincerely,     Sameera Byrne MD

## 2021-01-27 NOTE — ASSESSMENT & PLAN NOTE
· Surgery performed last month at [facility]   · Reports great recovery over the last month  Has had no pain, redness, drainage or problems from surgical site    · Reports she was placed on azithromcyin 250 mg daily as prophylaxis after the surgery that she is to continue for the next two months   · Hold while in the hospital while on ceftriaxone and vancomycin, resume azithromycin on discharge  · To continue with outpatient PT and scheduled ortho visits

## 2021-01-27 NOTE — ASSESSMENT & PLAN NOTE
· Acute cystitis, poa, a/e/b UA: small amount of leukocytes, nitrite negative   · Micro: WBC 10-20, Moderate amounts of bacteria   · Urine cultures were not ordered on admission  · S/p IV Ceftriaxone day #3/3

## 2021-01-27 NOTE — DISCHARGE SUMMARY
Discharge- Maye Better 1955, 77 y o  female MRN: [id number]    Unit/Bed#: -01 Encounter: [id number]    Primary Care Provider: Petey Christiansen MD   Date and time admitted to hospital: 1/25/2021  3:59 PM        * Cellulitis of left lower extremity  Assessment & Plan  Left thigh cellulitis, poa, noted to have 2 circular erythematous patches  · 3-4 cm circular erythema patch with warmth  Tender to palpation  Patient able to ambulate and has full ROM of left leg without pain  No drainage or fluctuance  · Currently on IV vancomycin for cellulitis, transition to oral doxycycline 100mg BID on discharge x 12days, to complete a 14day course  · Erythema improving  · Trend CBC and temp curve  · Tylenol prn for fevers    Urinary tract infection  Assessment & Plan  · Acute cystitis, poa, a/e/b UA: small amount of leukocytes, nitrite negative   · Micro: WBC 10-20, Moderate amounts of bacteria   · Urine cultures were not ordered on admission  · S/p IV Ceftriaxone day #3/3        Rheumatoid arthritis involving multiple sites with positive rheumatoid factor (HCC)  Assessment & Plan  Continue methotrexate, Plaquenil, and rituximab    Anemia  Assessment & Plan  · Hemoglobin 11 4 on admission  · Baseline Hb 7 9  · No signs of active bleeding   · Keep Hb > 7  · Trend CBC    Hyponatremia  Assessment & Plan  · Na 132 on admission  · S/p IVFs  · Na 134 this am    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   · Reports great recovery over the last month  Has had no pain, redness, drainage or problems from surgical site    · Reports she was placed on azithromcyin 250 mg daily as prophylaxis after the surgery that she is to continue for the next two months   · Hold while in the hospital while on ceftriaxone and vancomycin, resume azithromycin on discharge  · To continue with outpatient PT and scheduled ortho visits    Dysthymia  Assessment & Plan  · Continue elavil 100 mg daily         Discharging Physician / Practitioner: Willow Leon MD  PCP: Joselito Acosta MD  Admission Date:   Admission Orders (From admission, onward)     Ordered        01/25/21 1834  Inpatient Admission  Once                   Discharge Date: 01/27/21    Resolved Problems  Date Reviewed: 1/25/2021    None          Consultations During Hospital Stay:  ·     Procedures Performed:   ·     Significant Findings / Test Results:   · Left femur Xray        FINDINGS:     Osseous structures are somewhat osteopenic  The distal end of the intramedullary femoral long stem component of left thoracoplasty is identified, surrounded by cerclage wires  No acute fracture  No destructive osseous lesion  Sclerotic density in the   distal femoral metaphysis could represent sequela of prior bone infarct  Mild left knee osteoarthritis  Soft tissues are unremarkable      IMPRESSION:     No acute osseous abnormality identified in mid to distal femur        Incidental Findings:   ·      Test Results Pending at Discharge (will require follow up):   ·      Outpatient Tests Requested:  ·     Complications:      Reason for Admission: left thigh cellulitis    Hospital Course:     Millicent Medina is a 77 y o  female patient with PMH of hip replacement, dysthymia who originally presented to the hospital on 1/25/2021 due to left thigh cellulitis and UTI  She was febrile on admission and was started on IVFs, IV antibiotics and antipyretics  Erythema improved and she improved clinically  Please see above list of diagnoses and related plan for additional information       Condition at Discharge: stable     Discharge Day Visit / Exam:     Subjective:  No overnight events, left thigh erythema and discomfort improving    Vitals: Blood Pressure: 103/58 (01/27/21 0723)  Pulse: 82 (01/27/21 0723)  Temperature: 98 2 °F (36 8 °C) (01/27/21 0723)  Temp Source: Oral (01/26/21 1032)  Respirations: 19 (01/27/21 1214)  Height: 5' 4" (162 6 cm) (01/25/21 1607)  Weight - Scale: 66 7 kg (147 lb) (01/25/21 1607)  SpO2: 100 % (01/27/21 0735)  Exam:   Physical Exam  Vitals signs and nursing note reviewed  Constitutional:       General: She is not in acute distress  Appearance: Normal appearance  She is not ill-appearing, toxic-appearing or diaphoretic  Cardiovascular:      Rate and Rhythm: Normal rate and regular rhythm  Pulses: Normal pulses  Heart sounds: No murmur  No gallop  Pulmonary:      Effort: Pulmonary effort is normal  No respiratory distress  Breath sounds: Normal breath sounds  No stridor  No wheezing, rhonchi or rales  Abdominal:      General: Bowel sounds are normal  There is no distension  Palpations: Abdomen is soft  Tenderness: There is no abdominal tenderness  There is no right CVA tenderness, left CVA tenderness or guarding  Musculoskeletal: Normal range of motion  General: No swelling or deformity  Right lower leg: No edema  Left lower leg: No edema  Skin:     General: Skin is warm  Capillary Refill: Capillary refill takes less than 2 seconds  Coloration: Skin is not jaundiced  Findings: Erythema (left thigh - improving) present  No bruising or lesion  Neurological:      General: No focal deficit present  Mental Status: She is alert  Mental status is at baseline  Cranial Nerves: No cranial nerve deficit  Psychiatric:         Attention and Perception: Attention normal          Mood and Affect: Mood normal          Speech: Speech normal          Behavior: Behavior is cooperative  Discussion with Family: Patient declined family update    Discharge instructions/Information to patient and family:   See after visit summary for information provided to patient and family  Provisions for Follow-Up Care:  See after visit summary for information related to follow-up care and any pertinent home health orders  Disposition:     Home    For Discharges to [facility]:   · Not Applicable to this Patient - Not Applicable to this Patient    Planned Readmission: No     Discharge Statement:  I spent 45 minutes discharging the patient  This time was spent on the day of discharge  I had direct contact with the patient on the day of discharge  Greater than 50% of the total time was spent examining patient, answering all patient questions, arranging and discussing plan of care with patient as well as directly providing post-discharge instructions  Additional time then spent on discharge activities  Discharge Medications:  See after visit summary for reconciled discharge medications provided to patient and family        ** Please Note: This note has been constructed using a voice recognition system **

## 2021-01-30 LAB
BACTERIA BLD CULT: NORMAL
BACTERIA BLD CULT: NORMAL

## 2022-08-13 ENCOUNTER — APPOINTMENT (EMERGENCY)
Dept: RADIOLOGY | Facility: HOSPITAL | Age: 67
End: 2022-08-13
Payer: MEDICARE

## 2022-08-13 ENCOUNTER — HOSPITAL ENCOUNTER (EMERGENCY)
Facility: HOSPITAL | Age: 67
Discharge: HOME | End: 2022-08-13
Attending: EMERGENCY MEDICINE
Payer: MEDICARE

## 2022-08-13 VITALS
DIASTOLIC BLOOD PRESSURE: 63 MMHG | HEART RATE: 67 BPM | WEIGHT: 148 LBS | BODY MASS INDEX: 25.27 KG/M2 | OXYGEN SATURATION: 95 % | RESPIRATION RATE: 20 BRPM | SYSTOLIC BLOOD PRESSURE: 140 MMHG | HEIGHT: 64 IN | TEMPERATURE: 99.8 F

## 2022-08-13 DIAGNOSIS — U07.1 COVID-19 VIRUS INFECTION: ICD-10-CM

## 2022-08-13 DIAGNOSIS — J20.9 ACUTE BRONCHITIS WITH BRONCHOSPASM: Primary | ICD-10-CM

## 2022-08-13 LAB
2HR DELTA HS TROPONIN: 1 NG/L
4HR DELTA HS TROPONIN: -1 NG/L
ALBUMIN SERPL BCP-MCNC: 3.8 G/DL (ref 3.5–5)
ALP SERPL-CCNC: 105 U/L (ref 46–116)
ALT SERPL W P-5'-P-CCNC: 72 U/L (ref 12–78)
ANION GAP SERPL CALCULATED.3IONS-SCNC: 10 MMOL/L (ref 4–13)
AST SERPL W P-5'-P-CCNC: 61 U/L (ref 5–45)
BASOPHILS # BLD AUTO: 0.01 THOUSANDS/ΜL (ref 0–0.1)
BASOPHILS NFR BLD AUTO: 0 % (ref 0–1)
BILIRUB SERPL-MCNC: 0.3 MG/DL (ref 0.2–1)
BUN SERPL-MCNC: 19 MG/DL (ref 5–25)
CALCIUM SERPL-MCNC: 9.6 MG/DL (ref 8.3–10.1)
CARDIAC TROPONIN I PNL SERPL HS: 23 NG/L
CARDIAC TROPONIN I PNL SERPL HS: 24 NG/L
CARDIAC TROPONIN I PNL SERPL HS: 25 NG/L
CHLORIDE SERPL-SCNC: 105 MMOL/L (ref 96–108)
CO2 SERPL-SCNC: 26 MMOL/L (ref 21–32)
CREAT SERPL-MCNC: 0.82 MG/DL (ref 0.6–1.3)
EOSINOPHIL # BLD AUTO: 0.07 THOUSAND/ΜL (ref 0–0.61)
EOSINOPHIL NFR BLD AUTO: 2 % (ref 0–6)
ERYTHROCYTE [DISTWIDTH] IN BLOOD BY AUTOMATED COUNT: 13.7 % (ref 11.6–15.1)
GFR SERPL CREATININE-BSD FRML MDRD: 73 ML/MIN/1.73SQ M
GLUCOSE SERPL-MCNC: 135 MG/DL (ref 65–140)
HCT VFR BLD AUTO: 42.8 % (ref 34.8–46.1)
HGB BLD-MCNC: 14.2 G/DL (ref 11.5–15.4)
IMM GRANULOCYTES # BLD AUTO: 0.01 THOUSAND/UL (ref 0–0.2)
IMM GRANULOCYTES NFR BLD AUTO: 0 % (ref 0–2)
LYMPHOCYTES # BLD AUTO: 1.57 THOUSANDS/ΜL (ref 0.6–4.47)
LYMPHOCYTES NFR BLD AUTO: 40 % (ref 14–44)
MCH RBC QN AUTO: 33.8 PG (ref 26.8–34.3)
MCHC RBC AUTO-ENTMCNC: 33.2 G/DL (ref 31.4–37.4)
MCV RBC AUTO: 102 FL (ref 82–98)
MONOCYTES # BLD AUTO: 0.53 THOUSAND/ΜL (ref 0.17–1.22)
MONOCYTES NFR BLD AUTO: 13 % (ref 4–12)
NEUTROPHILS # BLD AUTO: 1.78 THOUSANDS/ΜL (ref 1.85–7.62)
NEUTS SEG NFR BLD AUTO: 45 % (ref 43–75)
NRBC BLD AUTO-RTO: 0 /100 WBCS
PLATELET # BLD AUTO: 121 THOUSANDS/UL (ref 149–390)
PMV BLD AUTO: 10.1 FL (ref 8.9–12.7)
POTASSIUM SERPL-SCNC: 3.8 MMOL/L (ref 3.5–5.3)
PROT SERPL-MCNC: 6.9 G/DL (ref 6.4–8.4)
RBC # BLD AUTO: 4.2 MILLION/UL (ref 3.81–5.12)
SODIUM SERPL-SCNC: 141 MMOL/L (ref 135–147)
WBC # BLD AUTO: 3.97 THOUSAND/UL (ref 4.31–10.16)

## 2022-08-13 PROCEDURE — 93005 ELECTROCARDIOGRAM TRACING: CPT

## 2022-08-13 PROCEDURE — 99285 EMERGENCY DEPT VISIT HI MDM: CPT

## 2022-08-13 PROCEDURE — 85025 COMPLETE CBC W/AUTO DIFF WBC: CPT | Performed by: EMERGENCY MEDICINE

## 2022-08-13 PROCEDURE — 99285 EMERGENCY DEPT VISIT HI MDM: CPT | Performed by: EMERGENCY MEDICINE

## 2022-08-13 PROCEDURE — 84484 ASSAY OF TROPONIN QUANT: CPT | Performed by: EMERGENCY MEDICINE

## 2022-08-13 PROCEDURE — 36415 COLL VENOUS BLD VENIPUNCTURE: CPT

## 2022-08-13 PROCEDURE — 71046 X-RAY EXAM CHEST 2 VIEWS: CPT

## 2022-08-13 PROCEDURE — 80053 COMPREHEN METABOLIC PANEL: CPT | Performed by: EMERGENCY MEDICINE

## 2022-08-13 PROCEDURE — 94640 AIRWAY INHALATION TREATMENT: CPT

## 2022-08-13 RX ORDER — IPRATROPIUM BROMIDE AND ALBUTEROL SULFATE 2.5; .5 MG/3ML; MG/3ML
3 SOLUTION RESPIRATORY (INHALATION) ONCE
Status: COMPLETED | OUTPATIENT
Start: 2022-08-13 | End: 2022-08-13

## 2022-08-13 RX ORDER — ALBUTEROL SULFATE 90 UG/1
1-2 AEROSOL, METERED RESPIRATORY (INHALATION) EVERY 6 HOURS PRN
Qty: 18 G | Refills: 0 | Status: SHIPPED | OUTPATIENT
Start: 2022-08-13

## 2022-08-13 RX ADMIN — IPRATROPIUM BROMIDE AND ALBUTEROL SULFATE 3 ML: 2.5; .5 SOLUTION RESPIRATORY (INHALATION) at 20:27

## 2022-08-13 NOTE — ED PROVIDER NOTES
History  Chief Complaint   Patient presents with    Shortness of Breath     Pt has increased sob  Pt covid positive for a week, pt says she has lower back pain and fevesr  33-year-old female with history of RA taking  methotrexate and Cimzia complains of dyspnea, productive cough, anorexia, myalgias, nausea, infrequent mild diarrhea and fever that started 7 days ago  Fever has tapered off and cough has become nonproductive  She tested herself at home 6 days ago and was positive for COVID as is her son  Her doctor prescribed Paxlovid 6 days ago  After 1 dose she had severe vomiting and diarrhea and stopped taking that medication  Although her cough is now nonproductive and her fever has diminished she feels more short of breath  She becomes even more short of breath when she lays back  Continues to have postnasal drip  Denies pedal edema, chest pain, palpitations, bloody stool and pain or swelling of any extremity  Prior to Admission Medications   Prescriptions Last Dose Informant Patient Reported? Taking?    CRANBERRY PO 8/13/2022 at Unknown time Self Yes Yes   Sig: Take 4,200 mg by mouth 4 (four) times a day    Cholecalciferol (VITAMIN D3) 1000 UNITS CAPS 8/13/2022 at Unknown time Self Yes Yes   Sig: Take 2 capsules by mouth 2 (two) times a day    amitriptyline (ELAVIL) 75 mg tablet Not Taking at Unknown time Self Yes No   Sig: Take 100 mg by mouth daily at bedtime    Patient not taking: Reported on 8/13/2022   aspirin 325 mg tablet Not Taking at Unknown time  Yes No   Sig: Take 325 mg by mouth daily   Patient not taking: Reported on 8/13/2022   azithromycin (ZITHROMAX) 250 mg tablet Not Taking at Unknown time Self Yes No   Sig: Take 250 mg by mouth every 24 hours Take one daily for 2 months   Patient not taking: Reported on 8/13/2022   baclofen 10 mg tablet 8/13/2022 at Unknown time  Yes Yes   Sig: Take 10 mg by mouth daily at bedtime   docusate sodium (COLACE) 100 mg capsule 8/13/2022 at Unknown time  Yes Yes   Sig: Take 200 mg by mouth daily at bedtime   folic acid (FOLVITE) 212 MCG tablet 8/13/2022 at Unknown time  Yes Yes   Sig: Take 800 mcg by mouth 2 (two) times a day   gabapentin (NEURONTIN) 300 mg capsule 8/13/2022 at Unknown time Self Yes Yes   Sig: Take 300 mg by mouth 2 (two) times a day   hydroxychloroquine (PLAQUENIL) 200 mg tablet Not Taking at Unknown time Self Yes No   Sig: Take 200 mg by mouth daily 3 Tablets at HS   Patient not taking: Reported on 8/13/2022   methotrexate 2 5 mg tablet 8/13/2022 at Unknown time Self Yes Yes   Sig: Take 2 5 mg by mouth once a week 8 Tablets weekly   pramipexole (MIRAPEX) 0 25 mg tablet 8/13/2022 at Unknown time Self Yes Yes   Sig: Take 0 75 mg by mouth 4 (four) times a day       Facility-Administered Medications: None       Past Medical History:   Diagnosis Date    Chronic pain     Rhe[facility]oid [facility] ([facility] [address] )        Past Surgical History:   Procedure Laterality Date    CARPAL TUNNEL RELEASE Bilateral     HIP OPEN REDUCTION Right     HIP SURGERY Left     HYSTERECTOMY      TONSILLECTOMY         History reviewed  No pertinent family history  I have reviewed and agree with the history as documented  E-Cigarette/Vaping    E-Cigarette Use Never User      E-Cigarette/Vaping Substances    Nicotine No     THC No     CBD No     Flavoring No     Other No     Unknown No      Social History     Tobacco Use    Smoking status: Never Smoker    Smokeless tobacco: Never Used   Vaping Use    Vaping Use: Never used   Substance Use Topics    Alcohol use: Never    Drug use: No       Review of Systems   Constitutional: Positive for activity change, appetite change, fatigue and fever  HENT: Positive for congestion, postnasal drip and rhinorrhea  Negative for sore throat  Respiratory: Negative for cough and shortness of breath  Cardiovascular: Negative for chest pain, palpitations and leg swelling  Gastrointestinal: Positive for nausea (mild)  Negative for abdominal pain  Diarrhea: resolved  Vomiting: resolved  Genitourinary: Negative for dysuria, flank pain and hematuria  Neurological: Negative for dizziness, syncope, speech difficulty, light-headedness and headaches  All other systems reviewed and are negative  Physical Exam  Physical Exam  Vitals and nursing note reviewed  Constitutional:       General: She is not in acute distress  Appearance: She is well-developed  She is not ill-appearing or diaphoretic  HENT:      Head: Normocephalic and atraumatic  Mouth/Throat:      Mouth: Mucous membranes are moist       Pharynx: Oropharynx is clear  No pharyngeal swelling or oropharyngeal exudate  Eyes:      Conjunctiva/sclera: Conjunctivae normal       Pupils: Pupils are equal, round, and reactive to light  Neck:      Thyroid: No thyromegaly  Vascular: No JVD  Cardiovascular:      Rate and Rhythm: Normal rate and regular rhythm  Pulses: Normal pulses  Heart sounds: Normal heart sounds  No murmur heard  Pulmonary:      Effort: Pulmonary effort is normal       Breath sounds: Normal breath sounds  No wheezing or rales  Chest:      Chest wall: No tenderness or crepitus  Abdominal:      General: Bowel sounds are normal       Palpations: Abdomen is soft  Tenderness: There is no abdominal tenderness  There is no guarding or rebound  Musculoskeletal:         General: Normal range of motion  Cervical back: Normal range of motion and neck supple  Right lower leg: No tenderness  No edema  Left lower leg: No tenderness  No edema  Lymphadenopathy:      Cervical: No cervical adenopathy  Skin:     General: Skin is warm and dry  Capillary Refill: Capillary refill takes less than 2 seconds  Findings: No rash  Neurological:      General: No focal deficit present  Mental Status: She is alert and oriented to person, place, and time  Cranial Nerves: No cranial nerve deficit  Coordination: Coordination normal       Deep Tendon Reflexes: Reflexes are normal and symmetric     Psychiatric:         Mood and Affect: Mood normal          Behavior: Behavior normal          Vital Signs  ED Triage Vitals   Temperature Pulse Respirations Blood Pressure SpO2   08/13/22 1736 08/13/22 1736 08/13/22 1736 08/13/22 1736 08/13/22 1736   99 8 °F (37 7 °C) 71 18 161/70 98 %      Temp src Heart Rate Source Patient Position - Orthostatic VS BP Location FiO2 (%)   -- 08/13/22 1900 08/13/22 1900 -- --    Monitor Sitting        Pain Score       --                  Vitals:    08/13/22 1736 08/13/22 1900 08/13/22 2000   BP: 161/70 166/74 140/63   Pulse: 71 76 67   Patient Position - Orthostatic VS:  Sitting Sitting         Visual Acuity      ED Medications  Medications   ipratropium-albuterol (DUO-NEB) 0 5-2 5 mg/3 mL inhalation solution 3 mL (3 mL Nebulization Given 8/13/22 2027)       Diagnostic Studies  Results Reviewed     Procedure Component Value Units Date/Time    HS Troponin I 4hr [[id number]]  (Normal) Collected: 08/13/22 2149    Lab Status: Final result Specimen: Blood from Arm, Right Updated: 08/13/22 2221     hs TnI 4hr 23 ng/L      Delta 4hr hsTnI -1 ng/L     HS Troponin I 2hr [[id number]]  (Normal) Collected: 08/13/22 1939    Lab Status: Final result Specimen: Blood from Arm, Right Updated: 08/13/22 2011     hs TnI 2hr 25 ng/L      Delta 2hr hsTnI 1 ng/L     HS Troponin 0hr (reflex protocol) [[id number]]  (Normal) Collected: 08/13/22 1743    Lab Status: Final result Specimen: Blood from Arm, Right Updated: 08/13/22 1823     hs TnI 0hr 24 ng/L     Comprehensive metabolic panel [[id number]]  (Abnormal) Collected: 08/13/22 1743    Lab Status: Final result Specimen: Blood from Arm, Right Updated: 08/13/22 1819     Sodium 141 mmol/L      Potassium 3 8 mmol/L      Chloride 105 mmol/L      CO2 26 mmol/L      ANION GAP 10 mmol/L      BUN 19 mg/dL      Creatinine 0 82 mg/dL      Glucose 135 mg/dL      Calcium 9 6 mg/dL      AST 61 U/L      ALT 72 U/L      Alkaline Phosphatase 105 U/L      Total Protein 6 9 g/dL      Albumin 3 8 g/dL      Total Bilirubin 0 30 mg/dL      eGFR 73 ml/min/1 73sq m     Narrative:      [place] guidelines for Chronic Kidney Disease (CKD):     Stage 1 with normal or high GFR (GFR > 90 mL/min/1 73 square meters)    Stage 2 Mild CKD (GFR = 60-89 mL/min/1 73 square meters)    Stage 3A Moderate CKD (GFR = 45-59 mL/min/1 73 square meters)    Stage 3B Moderate CKD (GFR = 30-44 mL/min/1 73 square meters)    Stage 4 Severe CKD (GFR = 15-29 mL/min/1 73 square meters)    Stage 5 End Stage CKD (GFR <15 mL/min/1 73 square meters)  Note: GFR calculation is accurate only with a steady state creatinine    CBC and differential [[id number]]  (Abnormal) Collected: 08/13/22 1743    Lab Status: Final result Specimen: Blood from Arm, Right Updated: 08/13/22 1749     WBC 3 97 Thousand/uL      RBC 4 20 Million/uL      Hemoglobin 14 2 g/dL      Hematocrit 42 8 %       fL      MCH 33 8 pg      MCHC 33 2 g/dL      RDW 13 7 %      MPV 10 1 fL      Platelets 170 Thousands/uL      nRBC 0 /100 WBCs      Neutrophils Relative 45 %      Immat GRANS % 0 %      Lymphocytes Relative 40 %      Monocytes Relative 13 %      Eosinophils Relative 2 %      Basophils Relative 0 %      Neutrophils Absolute 1 78 Thousands/µL      Immature Grans Absolute 0 01 Thousand/uL      Lymphocytes Absolute 1 57 Thousands/µL      Monocytes Absolute 0 53 Thousand/µL      Eosinophils Absolute 0 07 Thousand/µL      Basophils Absolute 0 01 Thousands/µL                  XR chest 2 views   ED Interpretation by Pippa Chung DO (08/13 1940)   Unremarkable      Final Result by Loren Whitaker MD (08/14 2373)      No acute cardiopulmonary disease                    Workstation performed: [id number]                    Procedures  ECG 12 Lead Documentation Only    Date/Time: 8/13/2022 5:57 PM  Performed by: London Fuentes DO [place]  Authorized by: Vita Campuzano DO     Patient location:  ED  Previous ECG:     Previous ECG:  Unavailable    Comparison to cardiac monitor: Yes    Rate:     ECG rate assessment: normal    Rhythm:     Rhythm: sinus rhythm    Ectopy:     Ectopy: none    QRS:     QRS axis:  Normal    QRS intervals:  Normal  Conduction:     Conduction: normal    ST segments:     ST segments:  Normal  T waves:     T waves: normal               ED Course  ED Course as of 08/14/22 1548   Sat Aug 13, 2022   2004  Patient remains with stable vital signs, pulse ox 96% on room air  Troponin is elevated although she has no chest pain  Will wait for delta troponin  This does not sound cardiac though  She still concerned about her orthopnea  I do not hear or see the features of CHF or typical PÉ symptoms  Will try a DuoNeb  If delta troponin is elevated she will need to be admitted for further workup  2155  Patient states the DuoNeb loose and upper lungs, she coughed up mucus and feels much more comfortable now  Vital signs remained stable  She has not been tachycardic or hypoxemic here at all  There is no chest pain  MDM  Number of Diagnoses or Management Options  Acute bronchitis with bronchospasm: new and requires workup  COVID-19 virus infection: established and worsening  Diagnosis management comments: Persistent dyspnea and cough with improvement of other  Symptoms of recdent COVID19 infection  Patient on immune modulators, stopped Paxlovid d/t side effects  No clinical or historic risk factors for ACS or PÉ  No abnormal breath sounds nor hypoxemia/tachypnea or tachycardia  Will check EKG, labs and CXR  Clinically stable  Nonischemic EKG; normal delta troponin  Definite subjective improvemtn and mucous production after Duoneb given  Likely component of bronchospasm from her viral infection  Will Rx Proventil and d/c patient         Amount and/or Complexity of Data Reviewed  Clinical lab tests: ordered and reviewed  Tests in the radiology section of CPT®: ordered and reviewed  Review and summarize past medical records: yes  Independent visualization of images, tracings, or specimens: yes        Disposition  Final diagnoses:   Acute bronchitis with bronchospasm   COVID-19 virus infection     Time reflects when diagnosis was documented in both MDM as applicable and the Disposition within this note     Time User Action Codes Description Comment    8/13/2022  9:59 PM Azalee Rode Add [J20 9] Acute bronchitis with bronchospasm     8/13/2022 10:01 PM Azalee Rode Add [U07 1] COVID-19 virus infection       ED Disposition     ED Disposition   Discharge    Condition   Stable    Date/Time   Sat Aug 13, 2022 10:03 PM    Comment   [facility] discharge to home/self care  Follow-up Information     Follow up With Specialties Details Why Contact Info    Ildefonso Jim MD Family Medicine Call in 2 days As needed [address] [place] [address]  [phone]            Discharge Medication List as of 8/13/2022 10:06 PM      START taking these medications    Details   albuterol (Proventil HFA) 90 mcg/act inhaler Inhale 1-2 puffs every 6 (six) hours as needed for wheezing or shortness of breath, Starting Sat 8/13/2022, Normal         CONTINUE these medications which have NOT CHANGED    Details   baclofen 10 mg tablet Take 10 mg by mouth daily at bedtime, Historical Med      Cholecalciferol (VITAMIN D3) 1000 UNITS CAPS Take 2 capsules by mouth 2 (two) times a day , Historical Med      CRANBERRY PO Take 4,200 mg by mouth 4 (four) times a day , Historical Med      docusate sodium (COLACE) 100 mg capsule Take 200 mg by mouth daily at bedtime, Historical Med      folic acid (FOLVITE) 611 MCG tablet Take 800 mcg by mouth 2 (two) times a day, Historical Med      gabapentin (NEURONTIN) 300 mg capsule Take 300 mg by mouth 2 (two) times a day, Until Discontinued, Historical Med      methotrexate 2 5 mg tablet Take 2 5 mg by mouth once a week 8 Tablets weekly, Until Discontinued, Historical Med      pramipexole (MIRAPEX) 0 25 mg tablet Take 0 75 mg by mouth 4 (four) times a day , Historical Med      amitriptyline (ELAVIL) 75 mg tablet Take 100 mg by mouth daily at bedtime , Historical Med      aspirin 325 mg tablet Take 325 mg by mouth daily, Historical Med      azithromycin (ZITHROMAX) 250 mg tablet Take 250 mg by mouth every 24 hours Take one daily for 2 months, Historical Med      hydroxychloroquine (PLAQUENIL) 200 mg tablet Take 200 mg by mouth daily 3 Tablets at HS, Until Discontinued, Historical Med             No discharge procedures on file      PDMP Review     None          ED Provider  Electronically Signed by           Js Varela DO  08/14/22 1619

## 2022-08-14 LAB
ATRIAL RATE: 69 BPM
P AXIS: 19 DEGREES
PR INTERVAL: 170 MS
QRS AXIS: 24 DEGREES
QRSD INTERVAL: 70 MS
QT INTERVAL: 388 MS
QTC INTERVAL: 415 MS
T WAVE AXIS: 57 DEGREES
VENTRICULAR RATE: 69 BPM

## 2022-08-14 PROCEDURE — 93010 ELECTROCARDIOGRAM REPORT: CPT | Performed by: INTERNAL MEDICINE

## 2022-08-14 NOTE — DISCHARGE INSTRUCTIONS
Continue present medications  Use the Proventil inhaler every 4 hours if needed for chest tightness or shortness of breath        Follow-up with your doctor In 2 days if not better

## 2022-10-12 PROBLEM — N39.0 URINARY TRACT INFECTION: Status: RESOLVED | Noted: 2021-01-25 | Resolved: 2022-10-12

## 2024-12-18 ENCOUNTER — HOSPITAL ENCOUNTER (OUTPATIENT)
Dept: HOSPITAL 99 - WDC | Age: 69
End: 2024-12-18
Payer: MEDICARE

## 2024-12-18 DIAGNOSIS — Z12.31: Primary | ICD-10-CM
